# Patient Record
Sex: FEMALE | Race: WHITE | NOT HISPANIC OR LATINO | Employment: FULL TIME | URBAN - METROPOLITAN AREA
[De-identification: names, ages, dates, MRNs, and addresses within clinical notes are randomized per-mention and may not be internally consistent; named-entity substitution may affect disease eponyms.]

---

## 2018-04-13 ENCOUNTER — OFFICE VISIT (OUTPATIENT)
Dept: OBSTETRICS AND GYNECOLOGY | Age: 53
End: 2018-04-13

## 2018-04-13 VITALS
SYSTOLIC BLOOD PRESSURE: 124 MMHG | WEIGHT: 137 LBS | DIASTOLIC BLOOD PRESSURE: 82 MMHG | BODY MASS INDEX: 25.21 KG/M2 | HEIGHT: 62 IN

## 2018-04-13 DIAGNOSIS — Z12.31 SCREENING MAMMOGRAM, ENCOUNTER FOR: ICD-10-CM

## 2018-04-13 DIAGNOSIS — N95.1 MENOPAUSAL SYNDROME (HOT FLASHES): ICD-10-CM

## 2018-04-13 DIAGNOSIS — Z01.419 WELL WOMAN EXAM WITH ROUTINE GYNECOLOGICAL EXAM: Primary | ICD-10-CM

## 2018-04-13 PROCEDURE — 99396 PREV VISIT EST AGE 40-64: CPT | Performed by: NURSE PRACTITIONER

## 2018-04-13 PROCEDURE — 99213 OFFICE O/P EST LOW 20 MIN: CPT | Performed by: NURSE PRACTITIONER

## 2018-04-13 NOTE — PROGRESS NOTES
[facility]  Routine Annual Visit    2018    Patient: Tierney Arellano          MR#:[id number]      History of Present Illness    53 y.o. female  who presents for annual exam. Last annual with Dr. Hagan 2015. Pap negative, HPV negative. Last mammogram negative 2014. Colonoscopy 3 years ago. Tierney is postmenopausal- periods completely stopped several years ago. Tierney complains today of vasomotor symptoms, irritability, and lack of focus. She was prescribed HRT by Dr. Hagan several years ago but Tierney reports never took due to insurance issues. She requests a trial of HRT and also requests patches if covered by her insurance. Counseled on need for estrogen with a progesterone due to intact uterus. Tierney has no other complaints today. Declines STI testing. Sees PCP regularly per pt.      No LMP recorded (lmp unknown). Patient is postmenopausal.  Obstetric History:  OB History      Para Term  AB Living    2 2 2   2    SAB TAB Ectopic Molar Multiple Live Births         2         Menstrual History:     No LMP recorded (lmp unknown). Patient is postmenopausal.       Sexual History:       ________________________________________  There is no problem list on file for this patient.      Past Medical History:   Diagnosis Date   • Anemia    • Diverticulitis    • Headache     SINUS HEADACHE / MIGRAINE   • IBS (irritable bowel syndrome)        Past Surgical History:   Procedure Laterality Date   • BREAST AUGMENTATION      DOMINIQUE LATERAL   • KNEE SURGERY Right    • TUBAL ABDOMINAL LIGATION         History   Smoking Status   • Former Smoker   • Packs/day: 0.50   • Types: Cigarettes   Smokeless Tobacco   • Never Used       Family History   Problem Relation Age of Onset   • Osteoporosis Mother    • Thyroid disease Mother    • Diabetes Mother    • Heart disease Mother    • Hypertension Mother    • Stroke Mother    • Thyroid disease Father    • Diabetes Father    • Heart disease  Father    • Hypertension Father    • Osteoporosis Sister    • Lupus Sister    • Thyroid disease Sister    • Heart disease Sister    • Hypertension Sister    • Colon cancer Maternal Grandmother      late 60's   • Hodgkin's lymphoma Sister      NON   • Skin cancer Brother        Prior to Admission medications    Medication Sig Start Date End Date Taking? Authorizing Provider   Ascorbic Acid (VITAMIN C ER PO) Take  by mouth.   Yes Historical Provider, MD   CALCIUM CITRATE PO Take  by mouth.   Yes Historical Provider, MD   Cholecalciferol (VITAMIN D PO) Take  by mouth.   Yes Historical Provider, MD Cassidy Zingiber officinalis, (FELIPE PO) Take  by mouth.   Yes Historical Provider, MD   POTASSIUM CITRATE PO Take  by mouth.   Yes Historical Provider, MD   TURMERIC PO Take  by mouth.   Yes Historical Provider, MD     ________________________________________    Current contraception: post menopausal status and tubal ligation  Family history of uterine or ovarian cancer: no  Family History of colon cancer/colon polyps: no  History of abnormal mammogram: no  History of abnormal lipids: no    The following portions of the patient's history were reviewed and updated as appropriate: allergies, current medications, past family history, past medical history, past social history, past surgical history and problem list.    Review of Systems   Constitutional: Negative.    HENT: Negative.    Eyes: Negative for visual disturbance.   Respiratory: Negative for cough, shortness of breath and wheezing.    Cardiovascular: Negative for chest pain, palpitations and leg swelling.   Gastrointestinal: Negative for abdominal distention, abdominal pain, blood in stool, constipation, diarrhea, nausea and vomiting.   Endocrine: Positive for heat intolerance. Negative for cold intolerance.   Genitourinary: Negative for difficulty urinating, dyspareunia, dysuria, frequency, genital sores, hematuria, menstrual problem, pelvic pain, urgency, vaginal  "bleeding, vaginal discharge and vaginal pain.   Musculoskeletal: Negative.    Skin: Negative.    Neurological: Negative for dizziness, weakness, light-headedness, numbness and headaches.   Hematological: Negative.    Psychiatric/Behavioral: Positive for agitation and sleep disturbance.   Breasts: negative for lumps skin changes, dimpling, swelling, nipple changes/discharge bilaterally. Bilateral implants intact.      Objective   Physical Exam    /82   Ht 157.5 cm (62\")   Wt 62.1 kg (137 lb)   LMP  (LMP Unknown)   Breastfeeding? No   BMI 25.06 kg/m²    BP Readings from Last 3 Encounters:   04/13/18 124/82      Wt Readings from Last 3 Encounters:   04/13/18 62.1 kg (137 lb)        BMI: Estimated body mass index is 25.06 kg/m² as calculated from the following:    Height as of this encounter: 157.5 cm (62\").    Weight as of this encounter: 62.1 kg (137 lb).      General:   alert, appears stated age and cooperative   Heart: regular rate and rhythm, S1, S2 normal, no murmur, click, rub or gallop   Lungs: clear to auscultation bilaterally   Abdomen: soft, non-tender, without masses or organomegaly   Breast: inspection negative, no nipple discharge or bleeding, no masses or nodularity palpable   Vulva: normal   Vagina: normal mucosa, normal discharge   Cervix: no cervical motion tenderness and no lesions   Uterus: normal size, mobile, non-tender or normal shape and consistency   Adnexa: no mass, fullness, tenderness     Assessment:    1. Normal annual exam   2. HRT- counseled on risks, benefits, alternatives. Patient has been made aware of the black box warming from FDA about heart attacks, strokes, breast cancer, uterine cancer, and VTE risks. WHI discussed. Suggest lowest dose for shortest duration. Pt understands the risks and desires to proceed with therapy. 3 month supply given until pt has mammogram- will then refill for the year. Also recommend follow up on HRT in 3 months.  3. Healthy lifestyle " modifications discussed, counseled on self breast exams and bone health        Plan:    []  Rx:   [x]  Mammogram request made  [x]  PAP done  []  Occult fecal blood test (Insure)  []  Labs:   []  GC/Chl/TV  []  DEXA scan   []  Referral for colonoscopy:           LARY Birch  4/13/2018 9:57 AM

## 2018-04-17 LAB
CYTOLOGIST CVX/VAG CYTO: NORMAL
CYTOLOGY CVX/VAG DOC THIN PREP: NORMAL
DX ICD CODE: NORMAL
HIV 1 & 2 AB SER-IMP: NORMAL
HPV I/H RISK 4 DNA CVX QL PROBE+SIG AMP: NEGATIVE
OTHER STN SPEC: NORMAL
PATH REPORT.FINAL DX SPEC: NORMAL
STAT OF ADQ CVX/VAG CYTO-IMP: NORMAL

## 2018-04-18 ENCOUNTER — TELEPHONE (OUTPATIENT)
Dept: OBSTETRICS AND GYNECOLOGY | Age: 53
End: 2018-04-18

## 2018-04-18 NOTE — TELEPHONE ENCOUNTER
----- Message from LARY Orellana sent at 4/18/2018  9:55 AM EDT -----  Please inform patient her pap smear returned negative (normal). Thank you.

## 2018-04-19 ENCOUNTER — TELEPHONE (OUTPATIENT)
Dept: OBSTETRICS AND GYNECOLOGY | Age: 53
End: 2018-04-19

## 2018-04-24 ENCOUNTER — TELEPHONE (OUTPATIENT)
Dept: OBSTETRICS AND GYNECOLOGY | Age: 53
End: 2018-04-24

## 2018-07-19 ENCOUNTER — OFFICE VISIT (OUTPATIENT)
Dept: OBSTETRICS AND GYNECOLOGY | Age: 53
End: 2018-07-19

## 2018-07-19 VITALS
DIASTOLIC BLOOD PRESSURE: 70 MMHG | WEIGHT: 133 LBS | SYSTOLIC BLOOD PRESSURE: 100 MMHG | BODY MASS INDEX: 24.48 KG/M2 | HEIGHT: 62 IN

## 2018-07-19 DIAGNOSIS — N95.1 VASOMOTOR SYMPTOMS DUE TO MENOPAUSE: Primary | ICD-10-CM

## 2018-07-19 PROCEDURE — 99213 OFFICE O/P EST LOW 20 MIN: CPT | Performed by: NURSE PRACTITIONER

## 2018-07-19 NOTE — PROGRESS NOTES
[facility]  Routine Annual Visit    2018    Patient: Tierney Arellano          MR#:[id number]      History of Present Illness    53 y.o. female  who presents for follow up HRT initiation. Tierney was started on combipatch April of this year for severe hot flashes, irritability, and lack of focus. She reports her symptoms have improved and she has been happy with the response of the hormones. She has still not gone for her screening mammogram as highly encouraged last visit. She reports working 12 hour days and overtime during the summer. Advised we need benign mammogram on file to continue prescribing HRT. She promises she will get this done within the next 3 months.     No LMP recorded (lmp unknown). Patient is postmenopausal.  Obstetric History:  OB History      Para Term  AB Living    2 2 2     2    SAB TAB Ectopic Molar Multiple Live Births              2         Menstrual History:     No LMP recorded (lmp unknown). Patient is postmenopausal.       Sexual History:       ________________________________________  There is no problem list on file for this patient.      Past Medical History:   Diagnosis Date   • Anemia    • Diverticulitis    • Headache     SINUS HEADACHE / MIGRAINE   • IBS (irritable bowel syndrome)        Past Surgical History:   Procedure Laterality Date   • BREAST AUGMENTATION      DOMINIQUE LATERAL   • KNEE SURGERY Right    • TUBAL ABDOMINAL LIGATION         History   Smoking Status   • Former Smoker   • Packs/day: 0.50   • Types: Cigarettes   Smokeless Tobacco   • Never Used       Family History   Problem Relation Age of Onset   • Osteoporosis Mother    • Thyroid disease Mother    • Diabetes Mother    • Heart disease Mother    • Hypertension Mother    • Stroke Mother    • Thyroid disease Father    • Diabetes Father    • Heart disease Father    • Hypertension Father    • Osteoporosis Sister    • Lupus Sister    • Thyroid disease Sister    • Heart disease  "Sister    • Hypertension Sister    • Colon cancer Maternal Grandmother         late 60's   • Hodgkin's lymphoma Sister         NON   • Skin cancer Brother        Prior to Admission medications    Medication Sig Start Date End Date Taking? Authorizing Provider   Ascorbic Acid (VITAMIN C ER PO) Take  by mouth.    Historical Provider, MD   CALCIUM CITRATE PO Take  by mouth.    Historical Provider, MD   Cholecalciferol (VITAMIN D PO) Take  by mouth.    Historical Provider, MD   estradiol-norethindrone (COMBIPATCH) 0.05-0.14 MG/DAY patch Place 1 patch on the skin 2 (Two) Times a Week. 4/16/18   LARY Orellana   Ange, Zingiber officinalis, (ANGE PO) Take  by mouth.    Historical Provider, MD   POTASSIUM CITRATE PO Take  by mouth.    Historical Provider, MD   TURMERIC PO Take  by mouth.    Historical Provider, MD     ________________________________________    The following portions of the patient's history were reviewed and updated as appropriate: allergies, current medications, past family history, past medical history, past social history, past surgical history and problem list.    Review of Systems    Pertinent items are noted in HPI.     Objective   Physical Exam    LMP  (LMP Unknown)    BP Readings from Last 3 Encounters:   07/19/18 100/70   04/13/18 124/82      Wt Readings from Last 3 Encounters:   07/19/18 60.3 kg (133 lb)   04/13/18 62.1 kg (137 lb)        BMI: Estimated body mass index is 24.33 kg/m² as calculated from the following:    Height as of an earlier encounter on 7/19/18: 157.5 cm (62\").    Weight as of an earlier encounter on 7/19/18: 60.3 kg (133 lb).    Consult only    Assessment:    1. HRT- doing well on combipatch and desires to continue. Will only give 3 more months until pt gets mammogram. Reinforced risks- heart attacks, strokes, breast cancer, uterine cancer, and VTE risks. She verbalizes understanding and desires to continue on therapy. Plans to schedule mammogram within the next " several months. Order is in system. If she has any trouble at all she will call us.      Plan:    []  Rx:   []  Mammogram request made  []  PAP done  []  Occult fecal blood test (Insure)  []  Labs:   []  GC/Chl/TV  []  DEXA scan   []  Referral for colonoscopy:           Terrie Graham, APRN  7/19/2018 3:33 PM

## 2019-07-18 ENCOUNTER — OFFICE VISIT (OUTPATIENT)
Dept: OBSTETRICS AND GYNECOLOGY | Age: 54
End: 2019-07-18

## 2019-07-18 VITALS
BODY MASS INDEX: 24.66 KG/M2 | SYSTOLIC BLOOD PRESSURE: 110 MMHG | HEIGHT: 62 IN | DIASTOLIC BLOOD PRESSURE: 68 MMHG | WEIGHT: 134 LBS

## 2019-07-18 DIAGNOSIS — Z12.31 SCREENING MAMMOGRAM, ENCOUNTER FOR: ICD-10-CM

## 2019-07-18 DIAGNOSIS — Z01.419 WELL WOMAN EXAM WITH ROUTINE GYNECOLOGICAL EXAM: Primary | ICD-10-CM

## 2019-07-18 PROCEDURE — 99396 PREV VISIT EST AGE 40-64: CPT | Performed by: NURSE PRACTITIONER

## 2019-07-18 NOTE — PROGRESS NOTES
[facility]  Routine Annual Visit    2019    Patient: Tierney Arellano          MR#:[id number]      History of Present Illness    54 y.o. female  who presents for annual exam. Last pap negative, HPV negative 2018. Tierney has still not complied with getting her screening mammogram as advised multiple times. I will not refill her HRT until this is completed. Tierney is postmenopausal. No bleeding. She continues on combipatch with very good control of her vasomotor symptoms. She reports up to ate on cholesterol screening and physical with Dr. Bassett. Also reports up to date on colonoscopy. She has no complaints today.      No LMP recorded (lmp unknown). Patient is postmenopausal.  Obstetric History:  OB History      Para Term  AB Living    2 2 2     2    SAB TAB Ectopic Molar Multiple Live Births              2         Menstrual History:     No LMP recorded (lmp unknown). Patient is postmenopausal.       Sexual History:       ________________________________________  There is no problem list on file for this patient.      Past Medical History:   Diagnosis Date   • Anemia    • Diverticulitis    • Headache     SINUS HEADACHE / MIGRAINE   • IBS (irritable bowel syndrome)        Past Surgical History:   Procedure Laterality Date   • BREAST AUGMENTATION      DOMINIQUE LATERAL   • KNEE ARTHROSCOPY Right 2019    turned into much more complicated   • KNEE SURGERY Right    • TUBAL ABDOMINAL LIGATION         Social History     Tobacco Use   Smoking Status Former Smoker   • Packs/day: 0.50   • Types: Cigarettes   Smokeless Tobacco Never Used       Family History   Problem Relation Age of Onset   • Osteoporosis Mother    • Thyroid disease Mother    • Diabetes Mother    • Heart disease Mother    • Hypertension Mother    • Stroke Mother    • Thyroid disease Father    • Diabetes Father    • Heart disease Father    • Hypertension Father    • Osteoporosis Sister    • Lupus Sister    •  Thyroid disease Sister    • Heart disease Sister    • Hypertension Sister    • Colon cancer Maternal Grandmother         late 60's   • Hodgkin's lymphoma Sister         NON   • Skin cancer Brother        Prior to Admission medications    Medication Sig Start Date End Date Taking? Authorizing Provider   estradiol-norethindrone (COMBIPATCH) 0.05-0.14 MG/DAY patch Place 1 patch on the skin as directed by provider 2 (Two) Times a Week. 4/4/19  Yes Jose Yañez MD   estradiol-norethindrone (COMBIPATCH) 0.05-0.14 MG/DAY patch Place 1 patch on the skin as directed by provider 2 (Two) Times a Week. 8/16/18  Yes Nolberto Hamm MD   Ascorbic Acid (VITAMIN C ER PO) Take  by mouth.    Nolberto Hamm MD   CALCIUM CITRATE PO Take  by mouth.    Nolberto Hamm MD   Cholecalciferol (VITAMIN D PO) Take  by mouth.    Nolberto Hamm MD   Ange, Zingiber officinalis, (ANGE PO) Take  by mouth.    Nolberto Hamm MD   POTASSIUM CITRATE PO Take  by mouth.    Nolberto Hamm MD   TURMERIC PO Take  by mouth.    Nolberto Hamm MD     ________________________________________    The following portions of the patient's history were reviewed and updated as appropriate: allergies, current medications, past family history, past medical history, past social history, past surgical history and problem list.    Review of Systems   Constitutional: Negative.    HENT: Negative.    Eyes: Negative for visual disturbance.   Respiratory: Negative for cough, shortness of breath and wheezing.    Cardiovascular: Negative for chest pain, palpitations and leg swelling.   Gastrointestinal: Negative for abdominal distention, abdominal pain, blood in stool, constipation, diarrhea, nausea and vomiting.   Endocrine: Negative for cold intolerance and heat intolerance.   Genitourinary: Negative for difficulty urinating, dyspareunia, dysuria, frequency, genital sores, hematuria, menstrual problem, pelvic pain, urgency,  "vaginal bleeding, vaginal discharge and vaginal pain.   Musculoskeletal: Negative.    Skin: Negative.    Neurological: Negative for dizziness, weakness, light-headedness, numbness and headaches.   Hematological: Negative.    Psychiatric/Behavioral: Negative.    Breasts: negative for lumps skin changes, dimpling, swelling, nipple changes/discharge bilaterally         Objective   Physical Exam    /68   Ht 157.5 cm (62\")   Wt 60.8 kg (134 lb)   LMP  (LMP Unknown)   Breastfeeding? No   BMI 24.51 kg/m²    BP Readings from Last 3 Encounters:   07/18/19 110/68   07/19/18 100/70   07/19/18 100/70      Wt Readings from Last 3 Encounters:   07/18/19 60.8 kg (134 lb)   07/19/18 60.3 kg (133 lb)   07/19/18 60.3 kg (133 lb)        BMI: Estimated body mass index is 24.51 kg/m² as calculated from the following:    Height as of this encounter: 157.5 cm (62\").    Weight as of this encounter: 60.8 kg (134 lb).            General:   alert, appears stated age and cooperative   Heart: regular rate and rhythm, S1, S2 normal, no murmur, click, rub or gallop   Lungs: clear to auscultation bilaterally   Abdomen: soft, non-tender, without masses or organomegaly   Breast: inspection negative, no nipple discharge or bleeding, no masses or nodularity palpable   Vulva: normal   Vagina: normal mucosa, normal discharge   Cervix: no cervical motion tenderness and no lesions   Uterus: normal size, mobile, non-tender or normal shape and consistency   Adnexa: no mass, fullness, tenderness     Assessment:    1. Normal annual exam   2. HRT- Patient has been made aware of the black box warming from FDA about heart attacks, strokes, breast cancer, uterine cancer, and VTE risks.  3. Healthy lifestyle modifications discussed, counseled on self breast exams and bone health        Plan:    []  Rx:   [x]  Mammogram request made  [x]  PAP done  []  Occult fecal blood test (Insure)  []  Labs:   []  GC/Chl/TV  []  DEXA scan   []  Referral for " colonoscopy:           Terrie Graham, APRN  7/18/2019 11:11 AM

## 2019-07-23 LAB
CYTOLOGIST CVX/VAG CYTO: ABNORMAL
CYTOLOGY CVX/VAG DOC CYTO: ABNORMAL
CYTOLOGY CVX/VAG DOC THIN PREP: ABNORMAL
DX ICD CODE: ABNORMAL
DX ICD CODE: ABNORMAL
HIV 1 & 2 AB SER-IMP: ABNORMAL
HPV I/H RISK 4 DNA CVX QL PROBE+SIG AMP: POSITIVE
OTHER STN SPEC: ABNORMAL
PATHOLOGIST CVX/VAG CYTO: ABNORMAL
RECOM F/U CVX/VAG CYTO: ABNORMAL
STAT OF ADQ CVX/VAG CYTO-IMP: ABNORMAL

## 2019-07-29 ENCOUNTER — TELEPHONE (OUTPATIENT)
Dept: OBSTETRICS AND GYNECOLOGY | Age: 54
End: 2019-07-29

## 2019-07-29 PROBLEM — R87.810 ASCUS WITH POSITIVE HIGH RISK HPV CERVICAL: Status: ACTIVE | Noted: 2019-07-29

## 2019-07-29 PROBLEM — R87.610 ASCUS WITH POSITIVE HIGH RISK HPV CERVICAL: Status: ACTIVE | Noted: 2019-07-29

## 2019-07-29 NOTE — TELEPHONE ENCOUNTER
----- Message from LARY Orellana sent at 7/29/2019  1:45 PM EDT -----  Please inform patient:  Your recent pap smear showed mild inflammatory changes only.   These changes occur as a result of mild inflammation in the vagina  When the pap smear shows inflammation, we automatically check for the HPV virus.   This test for HPV returned positive    With this result further evaluation with colposcopy is indicated. Please help patient arrange colpo in about 3 months with Dr. Yañez. Thank you.

## 2019-08-02 ENCOUNTER — APPOINTMENT (OUTPATIENT)
Dept: MAMMOGRAPHY | Facility: HOSPITAL | Age: 54
End: 2019-08-02

## 2019-09-19 NOTE — TELEPHONE ENCOUNTER
Last annual 7/18/19, pap was ASCUS/hpv positive.  Has f/u appointment with Dr Hagan on 10/8/19.

## 2019-09-23 NOTE — TELEPHONE ENCOUNTER
Mariella please let patient know I have denied her hormone patch as she has still not gotten her mammogram. When mammogram is completed I will be happy to refill her hormones.

## 2019-11-04 ENCOUNTER — TELEPHONE (OUTPATIENT)
Dept: OBSTETRICS AND GYNECOLOGY | Age: 54
End: 2019-11-04

## 2019-11-11 ENCOUNTER — TELEPHONE (OUTPATIENT)
Dept: OBSTETRICS AND GYNECOLOGY | Age: 54
End: 2019-11-11

## 2019-11-12 DIAGNOSIS — R92.8 ABNORMALITY OF RIGHT BREAST ON SCREENING MAMMOGRAM: Primary | ICD-10-CM

## 2019-12-03 ENCOUNTER — PROCEDURE VISIT (OUTPATIENT)
Dept: OBSTETRICS AND GYNECOLOGY | Age: 54
End: 2019-12-03

## 2019-12-03 VITALS
DIASTOLIC BLOOD PRESSURE: 80 MMHG | HEIGHT: 62 IN | BODY MASS INDEX: 25.95 KG/M2 | WEIGHT: 141 LBS | SYSTOLIC BLOOD PRESSURE: 125 MMHG

## 2019-12-03 DIAGNOSIS — Z20.2 EXPOSURE TO STD: ICD-10-CM

## 2019-12-03 DIAGNOSIS — Z13.9 SPECIAL SCREENING: ICD-10-CM

## 2019-12-03 DIAGNOSIS — R92.8 ABNORMALITY OF RIGHT BREAST ON SCREENING MAMMOGRAM: ICD-10-CM

## 2019-12-03 DIAGNOSIS — R87.810 ASCUS WITH POSITIVE HIGH RISK HPV CERVICAL: Primary | ICD-10-CM

## 2019-12-03 DIAGNOSIS — R87.610 ASCUS WITH POSITIVE HIGH RISK HPV CERVICAL: Primary | ICD-10-CM

## 2019-12-03 LAB
B-HCG UR QL: NEGATIVE
INTERNAL NEGATIVE CONTROL: NEGATIVE
INTERNAL POSITIVE CONTROL: POSITIVE
Lab: NORMAL

## 2019-12-03 PROCEDURE — 81025 URINE PREGNANCY TEST: CPT | Performed by: OBSTETRICS & GYNECOLOGY

## 2019-12-03 PROCEDURE — 57455 BIOPSY OF CERVIX W/SCOPE: CPT | Performed by: OBSTETRICS & GYNECOLOGY

## 2019-12-03 NOTE — PROGRESS NOTES
"Subjective   Tierney Arellano is a 54 y.o. female . colpo today last pap  07/18/19 ascus . hpv pos , other pap 04/13/18 neg.   Patient has a new partner as of Feb this year and they recently got . Never had an abnormal pap until now. Patient does use tobacco. Had MGM on 11/11/19 and has mgm scheduled 12/9 for follow-up on right breast. Desires to resume Combi-patch after mgm if wnl.       History of Present Illness    The following portions of the patient's history were reviewed and updated as appropriate: allergies, current medications, past family history, past medical history, past social history, past surgical history and problem list.    Review of Systems   Constitutional: Negative for chills, fatigue and fever.   Gastrointestinal: Negative for abdominal distention and abdominal pain.   Genitourinary: Negative for dyspareunia, dysuria, menstrual problem, pelvic pain, vaginal bleeding, vaginal discharge and vaginal pain.   All other systems reviewed and are negative.    /80   Ht 157.5 cm (62\")   Wt 64 kg (141 lb)   LMP  (LMP Unknown)   Breastfeeding? No   BMI 25.79 kg/m²     Objective   Physical Exam   Constitutional: She is oriented to person, place, and time. She appears well-developed and well-nourished.   Pulmonary/Chest: Effort normal.   Neurological: She is alert and oriented to person, place, and time.   Skin: Skin is warm and dry.   Psychiatric: She has a normal mood and affect. Her behavior is normal.   Nursing note and vitals reviewed.      Assessment/Plan   Tierney was seen today for gynecologic exam.    Diagnoses and all orders for this visit:    ASCUS with positive high risk HPV cervical  -     Reference Histopathology    Special screening  -     POC Pregnancy, Urine    Abnormality of right breast on screening mammogram    Exposure to STD  -     NuSwab VG+ - Swab, Vagina       Counseling was given to patient for the following topics: diagnostic results, instructions for management, risk " factor reductions and importance of treatment compliance .

## 2019-12-03 NOTE — PROGRESS NOTES
Procedure   Procedures    Colposcopy Procedure Note    Indications: Pap smear 5 months ago showed: ASCUS with POSITIVE high risk HPV. The prior pap showed no abnormalities.  Prior cervical/vaginal disease: none. Prior cervical treatment: no treatment.    Procedure Details   The risks and benefits of the procedure and Written informed consent obtained.    Speculum placed in vagina and excellent visualization of cervix achieved, cervix swabbed x 3 with acetic acid solution.    Findings:  Cervix: acetowhite lesion(s) noted at 6 and 10  o'clock; SCJ visualized - lesion at 6 and 10  o'clock, cervical biopsies taken at 6 and 10  o'clock, specimen labelled and sent to pathology and hemostasis achieved with Monsel's solution.  Vaginal inspection: vaginal colposcopy not performed.  Vulvar colposcopy: vulvar colposcopy not performed.    Specimens: cervical biopsy x 2    Complications: none.  Patient tolerated the procedure well without complications.    Plan:  Specimens labelled and sent to Pathology.  Will base further treatment on Pathology findings.  Post biopsy instructions given to patient.

## 2019-12-06 LAB
A VAGINAE DNA VAG QL NAA+PROBE: NORMAL SCORE
BVAB2 DNA VAG QL NAA+PROBE: NORMAL SCORE
C ALBICANS DNA VAG QL NAA+PROBE: NEGATIVE
C GLABRATA DNA VAG QL NAA+PROBE: NEGATIVE
C TRACH RRNA SPEC QL NAA+PROBE: NEGATIVE
MEGA1 DNA VAG QL NAA+PROBE: NORMAL SCORE
N GONORRHOEA RRNA SPEC QL NAA+PROBE: NEGATIVE
T VAGINALIS RRNA SPEC QL NAA+PROBE: NEGATIVE

## 2019-12-09 LAB
DX ICD CODE: NORMAL
PATH REPORT.FINAL DX SPEC: NORMAL
PATH REPORT.GROSS SPEC: NORMAL
PATH REPORT.RELEVANT HX SPEC: NORMAL
PATH REPORT.SITE OF ORIGIN SPEC: NORMAL
PATHOLOGIST NAME: NORMAL
PAYMENT PROCEDURE: NORMAL

## 2019-12-10 ENCOUNTER — TELEPHONE (OUTPATIENT)
Dept: OBSTETRICS AND GYNECOLOGY | Age: 54
End: 2019-12-10

## 2019-12-10 DIAGNOSIS — N95.1 MENOPAUSAL SYMPTOMS: Primary | ICD-10-CM

## 2019-12-10 PROBLEM — N87.0 MILD DYSPLASIA OF CERVIX (CIN I): Status: ACTIVE | Noted: 2019-12-10

## 2019-12-10 NOTE — TELEPHONE ENCOUNTER
----- Message from Marlene Hagan MD sent at 12/9/2019  9:56 AM EST -----  Please call patient and notify of normal results of vaginal swab    Please call patient and notify of mild dysplasia on biopsies - needs repeat pap in one year

## 2019-12-10 NOTE — TELEPHONE ENCOUNTER
Patient phoned for her results.  Given and verbalized understanding.  Also, requests her hormone patch be sent to [facility].

## 2020-09-10 ENCOUNTER — TELEPHONE (OUTPATIENT)
Dept: OBSTETRICS AND GYNECOLOGY | Age: 55
End: 2020-09-10

## 2020-09-10 NOTE — TELEPHONE ENCOUNTER
CALLED PT HAD HER SCHEDULED 6/8/20 AT [facility] FOR 6 MONTH DIAG MAMMO AND US. SHE HAD TO CX THAT AS SHE WAS HAVING A KNEE REPLACEMENT AND WANTED TO WAIT UNTIL AUGUST. BEEN REACHING OUT TO PATIENT TO GET RESCHEDULED. SHE AGREES TO GO TO [facility] AS [facility] HAS NOT BEEN WORKING OUT FOR HER WITH SCHEDULING. I HAVE PT RESCHEDULED FOR R DIAG MAMM AND US AT [facility] 9/29/20. WILL FOLLOW- UP TO SEE PT GOES.  MP

## 2020-09-29 ENCOUNTER — HOSPITAL ENCOUNTER (OUTPATIENT)
Dept: ULTRASOUND IMAGING | Facility: HOSPITAL | Age: 55
Discharge: HOME | End: 2020-09-29

## 2020-09-29 ENCOUNTER — HOSPITAL ENCOUNTER (OUTPATIENT)
Dept: MAMMOGRAPHY | Facility: HOSPITAL | Age: 55
Discharge: HOME | End: 2020-09-29

## 2020-09-29 DIAGNOSIS — R92.8 ABNORMALITY OF RIGHT BREAST ON SCREENING MAMMOGRAM: ICD-10-CM

## 2020-09-29 PROCEDURE — 77065 DX MAMMO INCL CAD UNI: CPT

## 2020-09-29 PROCEDURE — 76642 ULTRASOUND BREAST LIMITED: CPT

## 2020-11-10 DIAGNOSIS — N95.1 MENOPAUSAL SYMPTOMS: ICD-10-CM

## 2020-11-10 RX ORDER — ESTRADIOL/NORETHINDRONE ACETATE TRANSDERMAL SYSTEM .05; .14 MG/D; MG/D
PATCH, EXTENDED RELEASE TRANSDERMAL
Qty: 24 EACH | Refills: 3 | Status: SHIPPED | OUTPATIENT
Start: 2020-11-10 | End: 2021-04-29

## 2020-11-10 NOTE — TELEPHONE ENCOUNTER
Please see Pap for 2019, looks like Terrie said 3 month Colpo, but did not return.Attempt to call pt with no working #.dn

## 2021-04-29 ENCOUNTER — OFFICE VISIT (OUTPATIENT)
Dept: OBSTETRICS AND GYNECOLOGY | Age: 56
End: 2021-04-29

## 2021-04-29 VITALS
SYSTOLIC BLOOD PRESSURE: 130 MMHG | DIASTOLIC BLOOD PRESSURE: 80 MMHG | HEIGHT: 62 IN | WEIGHT: 148 LBS | BODY MASS INDEX: 27.23 KG/M2

## 2021-04-29 DIAGNOSIS — Z01.419 WELL WOMAN EXAM WITH ROUTINE GYNECOLOGICAL EXAM: Primary | ICD-10-CM

## 2021-04-29 DIAGNOSIS — Z12.31 SCREENING MAMMOGRAM, ENCOUNTER FOR: ICD-10-CM

## 2021-04-29 PROBLEM — R92.8 ABNORMALITY OF RIGHT BREAST ON SCREENING MAMMOGRAM: Status: RESOLVED | Noted: 2019-11-12 | Resolved: 2021-04-29

## 2021-04-29 PROCEDURE — 99396 PREV VISIT EST AGE 40-64: CPT | Performed by: NURSE PRACTITIONER

## 2021-04-29 RX ORDER — VARENICLINE TARTRATE 1 MG/1
TABLET, FILM COATED ORAL
COMMUNITY
Start: 2021-03-12 | End: 2021-12-06

## 2021-04-29 RX ORDER — ESTRADIOL AND NORETHINDRONE ACETATE 1; .5 MG/1; MG/1
1 TABLET ORAL DAILY
Qty: 90 TABLET | Refills: 3 | Status: SHIPPED | OUTPATIENT
Start: 2021-04-29 | End: 2022-04-07

## 2021-04-29 NOTE — PROGRESS NOTES
[facility]  Routine Annual Visit    2021    Patient: Betty Hume          MR#:[id number]      History of Present Illness    56 y.o. female  who presents for annual exam.    Last pap ASCUS HPV positive followed by colpo with Dr. Hagan 2019- repeat cotesting today  Due for screening mammogram- order placed  Colonoscopy current per patient  Tierney is postmenopausal- no bleeding. Continues on combipatch with good control of vasomotor sxs. She does request to be switched to oral regimen as the patches are falling off easily. Counseled on the improved safety with transdermal route vs oral. Discussed risks of HRT in general.    Tierney has no complaints today      No LMP recorded (lmp unknown). Patient is postmenopausal.  Obstetric History:  OB History        2    Para   2    Term   2       0    AB   0    Living   2       SAB   0    TAB   0    Ectopic   0    Molar   0    Multiple   0    Live Births   2               Menstrual History:     No LMP recorded (lmp unknown). Patient is postmenopausal.       Sexual History:       ________________________________________  Patient Active Problem List   Diagnosis   • ASCUS with positive high risk HPV cervical   • Mild dysplasia of cervix (SAVITA I)       Past Medical History:   Diagnosis Date   • Abnormality of right breast on screening mammogram 2019   • Anemia    • ASCUS with positive high risk HPV cervical 2019   • Cervical dysplasia    • Diverticulitis    • Headache     SINUS HEADACHE / MIGRAINE   • HPV (human papilloma virus) infection    • IBS (irritable bowel syndrome)        Past Surgical History:   Procedure Laterality Date   • AUGMENTATION MAMMAPLASTY     • BREAST AUGMENTATION      DOMINIQUE LATERAL   • KNEE ARTHROSCOPY Right 2019    turned into much more complicated   • KNEE SURGERY Right    • TOTAL KNEE ARTHROPLASTY Right 2020   • TUBAL ABDOMINAL LIGATION         Social History     Tobacco Use   Smoking Status  Current Every Day Smoker   • Packs/day: 0.50   • Types: Cigarettes   Smokeless Tobacco Never Used       Family History   Problem Relation Age of Onset   • Osteoporosis Mother    • Thyroid disease Mother    • Diabetes Mother    • Heart disease Mother    • Hypertension Mother    • Stroke Mother    • Thyroid disease Father    • Diabetes Father    • Heart disease Father    • Hypertension Father    • Osteoporosis Sister    • Lupus Sister    • Thyroid disease Sister    • Heart disease Sister    • Hypertension Sister    • Colon cancer Maternal Grandmother         late 60's   • Hodgkin's lymphoma Sister         NON   • Skin cancer Brother    • Breast cancer Neg Hx    • Ovarian cancer Neg Hx    • Uterine cancer Neg Hx        Prior to Admission medications    Medication Sig Start Date End Date Taking? Authorizing Provider   CombiPatch 0.05-0.14 MG/DAY patch APPLY 1 PATCH ON THE SKIN 2 TIMES A WEEK AS DIRECTED BY PROVIDER 11/10/20   Marlene Hagan MD       The following portions of the patient's history were reviewed and updated as appropriate: allergies, current medications, past family history, past medical history, past social history, past surgical history and problem list.    Review of Systems   Constitutional: Negative.    HENT: Negative.    Eyes: Negative for visual disturbance.   Respiratory: Negative for cough, shortness of breath and wheezing.    Cardiovascular: Negative for chest pain, palpitations and leg swelling.   Gastrointestinal: Negative for abdominal distention, abdominal pain, blood in stool, constipation, diarrhea, nausea and vomiting.   Endocrine: Negative for cold intolerance and heat intolerance.   Genitourinary: Negative for difficulty urinating, dyspareunia, dysuria, frequency, genital sores, hematuria, menstrual problem, pelvic pain, urgency, vaginal bleeding, vaginal discharge and vaginal pain.   Musculoskeletal: Negative.    Skin: Negative.    Neurological: Negative for dizziness, weakness,  "light-headedness, numbness and headaches.   Hematological: Negative.    Psychiatric/Behavioral: Negative.    Breasts: negative for lumps skin changes, dimpling, swelling, nipple changes/discharge bilaterally       Objective   Physical Exam    /80   Ht 157.5 cm (62\")   Wt 67.1 kg (148 lb)   LMP  (LMP Unknown)   Breastfeeding No   BMI 27.07 kg/m²    BP Readings from Last 3 Encounters:   04/29/21 130/80   12/03/19 125/80   07/18/19 110/68      Wt Readings from Last 3 Encounters:   04/29/21 67.1 kg (148 lb)   12/03/19 64 kg (141 lb)   07/18/19 60.8 kg (134 lb)        BMI: Estimated body mass index is 27.07 kg/m² as calculated from the following:    Height as of this encounter: 157.5 cm (62\").    Weight as of this encounter: 67.1 kg (148 lb).            General:   alert, appears stated age and cooperative   Heart: regular rate and rhythm, S1, S2 normal, no murmur, click, rub or gallop   Lungs: clear to auscultation bilaterally   Abdomen: soft, non-tender, without masses or organomegaly   Breast: inspection negative, no nipple discharge or bleeding, no masses or nodularity palpable   Vulva: External genitalia including bartholin's glands, Urethra, [place]'s gland and urethra meatus are normal, Perineum, rectum and anus appear normal  and Bladder appears normal without significant prolapse    Vagina: normal mucosa, normal discharge   Cervix: no cervical motion tenderness and no lesions   Uterus: normal size, mobile, non-tender or normal shape and consistency   Adnexa: no mass, fullness, tenderness     Assessment:    Diagnoses and all orders for this visit:    1. Well woman exam with routine gynecological exam (Primary)  -     IgP, Aptima HPV    2. Screening mammogram, encounter for  -     Mammo Screening Digital Tomosynthesis Bilateral With CAD; Future    Other orders  -     estradiol-norethindrone (Activella) 1-0.5 MG per tablet; Take 1 tablet by mouth Daily.  Dispense: 90 tablet; Refill: 3        Healthy " lifestyle modifications discussed, counseled on self breast exams and bone health    All of the patient's questions were addressed and answered, I have encouraged her to call for today's test results if she has not received them within 10 days.  Patient is advised to call with any change in her condition or with any other questions, otherwise return in 12 months for annual examination.      Terrie Graham, APRN  4/29/2021 14:03 EDT

## 2021-05-04 LAB
CYTOLOGIST CVX/VAG CYTO: NORMAL
CYTOLOGY CVX/VAG DOC CYTO: NORMAL
CYTOLOGY CVX/VAG DOC THIN PREP: NORMAL
DX ICD CODE: NORMAL
HIV 1 & 2 AB SER-IMP: NORMAL
HPV I/H RISK 4 DNA CVX QL PROBE+SIG AMP: NEGATIVE
Lab: NORMAL
OTHER STN SPEC: NORMAL
STAT OF ADQ CVX/VAG CYTO-IMP: NORMAL

## 2021-05-06 ENCOUNTER — TELEPHONE (OUTPATIENT)
Dept: OBSTETRICS AND GYNECOLOGY | Age: 56
End: 2021-05-06

## 2021-10-29 ENCOUNTER — TELEPHONE (OUTPATIENT)
Dept: CARDIOLOGY | Facility: CLINIC | Age: 56
End: 2021-10-29

## 2021-10-29 ENCOUNTER — TRANSCRIBE ORDERS (OUTPATIENT)
Dept: CARDIOLOGY | Facility: CLINIC | Age: 56
End: 2021-10-29

## 2021-10-29 DIAGNOSIS — I73.9 PVD (PERIPHERAL VASCULAR DISEASE) WITH CLAUDICATION (HCC): Primary | ICD-10-CM

## 2021-12-06 ENCOUNTER — TELEPHONE (OUTPATIENT)
Dept: CARDIOLOGY | Facility: CLINIC | Age: 56
End: 2021-12-06

## 2021-12-06 ENCOUNTER — OFFICE VISIT (OUTPATIENT)
Dept: CARDIOLOGY | Facility: CLINIC | Age: 56
End: 2021-12-06

## 2021-12-06 VITALS
TEMPERATURE: 97 F | BODY MASS INDEX: 27.05 KG/M2 | DIASTOLIC BLOOD PRESSURE: 74 MMHG | RESPIRATION RATE: 11 BRPM | OXYGEN SATURATION: 99 % | HEIGHT: 62 IN | HEART RATE: 89 BPM | SYSTOLIC BLOOD PRESSURE: 118 MMHG | WEIGHT: 147 LBS

## 2021-12-06 DIAGNOSIS — R07.2 PRECORDIAL PAIN: ICD-10-CM

## 2021-12-06 DIAGNOSIS — Z72.0 TOBACCO USE: ICD-10-CM

## 2021-12-06 DIAGNOSIS — I73.9 PVD (PERIPHERAL VASCULAR DISEASE) WITH CLAUDICATION (HCC): Primary | ICD-10-CM

## 2021-12-06 PROBLEM — R07.9 CHEST PAIN: Status: ACTIVE | Noted: 2021-12-06

## 2021-12-06 PROCEDURE — 99204 OFFICE O/P NEW MOD 45 MIN: CPT | Performed by: INTERNAL MEDICINE

## 2021-12-06 PROCEDURE — 93000 ELECTROCARDIOGRAM COMPLETE: CPT | Performed by: INTERNAL MEDICINE

## 2021-12-06 RX ORDER — ASPIRIN 81 MG/1
81 TABLET ORAL DAILY
Qty: 30 TABLET | Refills: 0 | Status: SHIPPED | OUTPATIENT
Start: 2021-12-06

## 2021-12-06 RX ORDER — RAMIPRIL 2.5 MG/1
2.5 CAPSULE ORAL DAILY
Qty: 30 CAPSULE | Refills: 0 | Status: SHIPPED | OUTPATIENT
Start: 2021-12-06 | End: 2022-07-29 | Stop reason: SDUPTHER

## 2021-12-06 RX ORDER — RAMIPRIL 2.5 MG/1
2.5 CAPSULE ORAL DAILY
Qty: 90 CAPSULE | Refills: 3 | Status: SHIPPED | OUTPATIENT
Start: 2021-12-06 | End: 2021-12-06 | Stop reason: SDUPTHER

## 2021-12-06 RX ORDER — ASPIRIN 81 MG/1
81 TABLET ORAL DAILY
Qty: 90 TABLET | Refills: 3 | Status: SHIPPED | OUTPATIENT
Start: 2021-12-06 | End: 2021-12-06 | Stop reason: SDUPTHER

## 2021-12-06 NOTE — PROGRESS NOTES
[facility]  [facility]  [address] [place] [address]  Dept: [phone]  Dept Fax: [phone]    Betty Hume  1965    New Patient Office Note    History of Present Illness:  Betty Hume is a 56 y.o. female who presents to the clinic for New Patient. Chest pain and claudication- She is 56 years old with Hyperlipidemia, smoker has been having legs pain on walking, and JHONNY showed mild to moderate disease bilateral but she is also having chest pain, somewhat atypical., at resting and  also on exertion, sharp but also pressure, lasting 2-3 minutes, . And at least once a week, her Lipids were elevated and was started on Crestor 5 mg, has had problems with statins before , her BP is 135.80., her EKG sinus  With PAC`S. Her cardiac exam seems normal, will start ASA,  And also Altace 2,5 mg daily, will get a echo and also a stress nuclear, high risk due to PVD and also smoker    The following portions of the patient's history were reviewed and updated as appropriate: allergies, current medications, past family history, past medical history, past social history, past surgical history and problem list.    Medications:  aspirin  estradiol-norethindrone  ramipril  rosuvastatin    Subjective  Allergies   Allergen Reactions   • Eryc [Erythromycin] Anaphylaxis and Swelling   • Shellfish-Derived Products Anaphylaxis   • Latex Rash        Past Medical History:   Diagnosis Date   • Abnormality of right breast on screening mammogram 11/12/2019   • Anemia    • ASCUS with positive high risk HPV cervical 7/29/2019   • Cervical dysplasia    • Diverticulitis    • Headache     SINUS HEADACHE / MIGRAINE   • HPV (human papilloma virus) infection    • Hypercholesterolemia    • IBS (irritable bowel syndrome)        Past Surgical History:   Procedure Laterality Date   • AUGMENTATION MAMMAPLASTY     • BREAST AUGMENTATION  2004    DOMINIQUE LATERAL   • KNEE ARTHROSCOPY Right 05/20/2019    turned into  much more complicated   • KNEE SURGERY Right 2008   • TOTAL KNEE ARTHROPLASTY Right 05/2020   • TUBAL ABDOMINAL LIGATION  2003       Family History   Problem Relation Age of Onset   • Osteoporosis Mother    • Thyroid disease Mother    • Diabetes Mother    • Heart disease Mother    • Hypertension Mother    • Stroke Mother    • Asthma Mother    • Thyroid disease Father    • Diabetes Father    • Heart disease Father    • Hypertension Father    • Osteoporosis Sister    • Lupus Sister    • Thyroid disease Sister    • Heart disease Sister    • Hypertension Sister    • Colon cancer Maternal Grandmother         late 60's   • Hodgkin's lymphoma Sister         NON   • Skin cancer Brother    • Breast cancer Neg Hx    • Ovarian cancer Neg Hx    • Uterine cancer Neg Hx         Social History     Socioeconomic History   • Marital status:    • Number of children: 2   Tobacco Use   • Smoking status: Current Every Day Smoker     Packs/day: 0.50     Types: Cigarettes   • Smokeless tobacco: Never Used   Vaping Use   • Vaping Use: Never used   Substance and Sexual Activity   • Alcohol use: Yes     Comment: OCC   • Drug use: No   • Sexual activity: Yes     Partners: Male     Birth control/protection: Post-menopausal       Review of Systems   Constitutional: Negative.    HENT: Negative.    Respiratory: Negative.    Cardiovascular: Positive for chest pain.   Endocrine: Negative.    Genitourinary: Negative.    Musculoskeletal: Negative.    Skin: Negative.    Allergic/Immunologic: Negative.    Neurological: Negative.    Hematological: Negative.    Psychiatric/Behavioral: Negative.    All other systems reviewed and are negative.      Cardiovascular Procedures    ECHO/MUGA:   STRESS TESTS:   CARDIAC CATH:   DEVICES:   HOLTER:   CT/MRI:   VASCULAR:   CARDIOTHORACIC:     Objective  Vitals:    12/06/21 1035   BP: 118/74   BP Location: Left arm   Patient Position: Sitting   Cuff Size: Adult   Pulse: 89   Resp: 11   Temp: 97 °F (36.1 °C)  "  TempSrc: Infrared   SpO2: 99%   Weight: 66.7 kg (147 lb)   Height: 157.5 cm (62\")   PainSc:   4   PainLoc: Leg       Physical Exam  Constitutional:       Appearance: Healthy appearance. Not in distress.   Neck:      Vascular: No JVR. JVD normal.   Pulmonary:      Effort: Pulmonary effort is normal.      Breath sounds: Normal breath sounds. No wheezing. No rhonchi. No rales.   Chest:      Chest wall: Not tender to palpatation.   Cardiovascular:      PMI at left midclavicular line. Normal rate. Regular rhythm. Normal S1. Normal S2.      Murmurs: There is no murmur.      No gallop. No click. No rub.   Pulses:     Intact distal pulses.   Edema:     Peripheral edema absent.   Abdominal:      General: Bowel sounds are normal.      Palpations: Abdomen is soft.      Tenderness: There is no abdominal tenderness.   Musculoskeletal: Normal range of motion.         General: No tenderness. Skin:     General: Skin is warm and dry.   Neurological:      General: No focal deficit present.      Mental Status: Alert and oriented to person, place and time.          Diagnostic Data    ECG 12 Lead    Date/Time: 12/6/2021 1:09 PM  Performed by: Bryn Haile MD  Authorized by: Bryn Haile MD   Comparison: not compared with previous ECG   Previous ECG: no previous ECG available  Rhythm: sinus rhythm  Ectopy: atrial premature contractions  Rate: normal  BPM: 69  QRS axis: normal    Clinical impression: normal ECG            Assessment and Plan  Diagnoses and all orders for this visit:    PVD (peripheral vascular disease) with claudication (HCC)- Mild disease by JHONNY , will start ASA    Precordial pain- has some typical and atypical features, will get a stress nuclear , and echo  -     Cancel: Stress Test With Myocardial Perfusion One Day; Future  -     Adult Transthoracic Echo Complete W/ Cont if Necessary Per Protocol; Future  -     Stress Test With Myocardial Perfusion One Day; Future    Tobacco use- Still " smoking    Other orders  -     aspirin (aspirin) 81 MG EC tablet; Take 1 tablet by mouth Daily.  -     ramipril (Altace) 2.5 MG capsule; Take 1 capsule by mouth Daily.        Return in about 2 weeks (around 12/20/2021) for Recheck.    Bryn Haile MD  12/06/2021

## 2021-12-09 ENCOUNTER — PATIENT ROUNDING (BHMG ONLY) (OUTPATIENT)
Dept: CARDIOLOGY | Facility: CLINIC | Age: 56
End: 2021-12-09

## 2021-12-09 NOTE — PROGRESS NOTES
December 9, 2021    Hello, may I speak with Betty Hume?    My name is JIE     I am  with [facility]  [facility]  [address] [place] [address].    Before we get started may I verify your date of birth? 1965    I am calling to officially welcome you to our practice and ask about your recent visit. Is this a good time to talk? yesTell me about your visit with us. What things went well?  yes           We're always looking for ways to make our patients' experiences even better. Do you have recommendations on ways we may improve?  no    Overall were you satisfied with your first visit to our practice? yes       I appreciate you taking the time to speak with me today. Is there anything else I can do for you? no      Thank you, and have a great day.

## 2021-12-09 NOTE — PROGRESS NOTES
December 9, 2021    Hello, may I speak with Betty Hume?    My name is shukri      I am  with [facility]  [facility]  [address] [place] [address].    Before we get started may I verify your date of birth? 1965    I am calling to officially welcome you to our practice and ask about your recent visit. Is this a good time to talk? yes    Tell me about your visit with us. What things went well?  went well       We're always looking for ways to make our patients' experiences even better. Do you have recommendations on ways we may improve?  no not really     Overall were you satisfied with your first visit to our practice? yes       I appreciate you taking the time to speak with me today. Is there anything else I can do for you? no      Thank you, and have a great day.

## 2021-12-09 NOTE — PROGRESS NOTES
December 9, 2021    Hello, may I speak with Betty Hume?    My name is JIE      I am  with [facility]  [facility]  [address] [place] [address].        UNABLE TO CONTACT PATIENT

## 2021-12-20 ENCOUNTER — OFFICE VISIT (OUTPATIENT)
Dept: CARDIOLOGY | Facility: CLINIC | Age: 56
End: 2021-12-20

## 2021-12-20 VITALS
WEIGHT: 147 LBS | HEIGHT: 62 IN | TEMPERATURE: 98 F | DIASTOLIC BLOOD PRESSURE: 78 MMHG | RESPIRATION RATE: 12 BRPM | OXYGEN SATURATION: 99 % | SYSTOLIC BLOOD PRESSURE: 128 MMHG | BODY MASS INDEX: 27.05 KG/M2 | HEART RATE: 77 BPM

## 2021-12-20 DIAGNOSIS — R07.2 PRECORDIAL PAIN: ICD-10-CM

## 2021-12-20 DIAGNOSIS — Z72.0 TOBACCO USE: ICD-10-CM

## 2021-12-20 DIAGNOSIS — I73.9 PVD (PERIPHERAL VASCULAR DISEASE) WITH CLAUDICATION (HCC): Primary | ICD-10-CM

## 2021-12-20 PROCEDURE — 99214 OFFICE O/P EST MOD 30 MIN: CPT | Performed by: INTERNAL MEDICINE

## 2021-12-20 NOTE — PROGRESS NOTES
[facility]  [facility]  [address] [place] [address]  Dept: [phone]  Dept Fax: [phone]    Betty Hume  1965    Follow Up Office Visit Note    History of Present Illness:  Betty Hume is a 56 y.o. female who presents to the clinic for Chest pain , She underwent a stress nuclear and this is normal, . She is not longer having any chest pain, her echo was also normal, BP is good 125.80, only on Altace 2,5 mg ., she has not been able to tolerate Crestor, will use Livalo 2 mg, has had muscle pain with multiples statins     The following portions of the patient's history were reviewed and updated as appropriate: allergies, current medications, past family history, past medical history, past social history, past surgical history and problem list.    Medications:  aspirin  estradiol-norethindrone  pitavastatin calcium tablet  ramipril    Subjective  Allergies   Allergen Reactions   • Eryc [Erythromycin] Anaphylaxis and Swelling   • Shellfish-Derived Products Anaphylaxis   • Latex Rash        Past Medical History:   Diagnosis Date   • Abnormality of right breast on screening mammogram 11/12/2019   • Anemia    • ASCUS with positive high risk HPV cervical 7/29/2019   • Cervical dysplasia    • Diverticulitis    • Headache     SINUS HEADACHE / MIGRAINE   • HPV (human papilloma virus) infection    • Hypercholesterolemia    • IBS (irritable bowel syndrome)        Past Surgical History:   Procedure Laterality Date   • AUGMENTATION MAMMAPLASTY     • BREAST AUGMENTATION  2004    DOMINIQUE LATERAL   • KNEE ARTHROSCOPY Right 05/20/2019    turned into much more complicated   • KNEE SURGERY Right 2008   • TOTAL KNEE ARTHROPLASTY Right 05/2020   • TUBAL ABDOMINAL LIGATION  2003       Family History   Problem Relation Age of Onset   • Osteoporosis Mother    • Thyroid disease Mother    • Diabetes Mother    • Heart disease Mother    • Hypertension Mother    • Stroke Mother    • Asthma  "Mother    • Thyroid disease Father    • Diabetes Father    • Heart disease Father    • Hypertension Father    • Osteoporosis Sister    • Lupus Sister    • Thyroid disease Sister    • Heart disease Sister    • Hypertension Sister    • Colon cancer Maternal Grandmother         late 60's   • Hodgkin's lymphoma Sister         NON   • Skin cancer Brother    • Breast cancer Neg Hx    • Ovarian cancer Neg Hx    • Uterine cancer Neg Hx         Social History     Socioeconomic History   • Marital status:    • Number of children: 2   Tobacco Use   • Smoking status: Current Every Day Smoker     Packs/day: 0.50     Types: Cigarettes   • Smokeless tobacco: Never Used   Vaping Use   • Vaping Use: Never used   Substance and Sexual Activity   • Alcohol use: Yes     Comment: OCC   • Drug use: No   • Sexual activity: Yes     Partners: Male     Birth control/protection: Post-menopausal       Review of Systems   Constitutional: Negative.    HENT: Negative.    Respiratory: Negative.    Cardiovascular: Negative.    Endocrine: Negative.    Genitourinary: Negative.    Musculoskeletal: Negative.    Skin: Negative.    Allergic/Immunologic: Negative.    Neurological: Negative.    Hematological: Negative.    Psychiatric/Behavioral: Negative.    All other systems reviewed and are negative.      Cardiovascular Procedures    ECHO/MUGA:   STRESS TESTS:   CARDIAC CATH:   DEVICES:   HOLTER:   CT/MRI:   VASCULAR:   CARDIOTHORACIC:     Objective  Vitals:    12/20/21 1541   BP: 128/78   BP Location: Left arm   Patient Position: Sitting   Cuff Size: Adult   Pulse: 77   Resp: 12   Temp: 98 °F (36.7 °C)   TempSrc: Infrared   SpO2: 99%   Weight: 66.7 kg (147 lb)   Height: 157.5 cm (62\")   PainSc: 0-No pain     Body mass index is 26.89 kg/m².     Physical Exam  Constitutional:       Appearance: Healthy appearance. Not in distress.   Neck:      Vascular: No JVR. JVD normal.   Pulmonary:      Effort: Pulmonary effort is normal.      Breath sounds: " Normal breath sounds. No wheezing. No rhonchi. No rales.   Chest:      Chest wall: Not tender to palpatation.   Cardiovascular:      PMI at left midclavicular line. Normal rate. Regular rhythm. Normal S1. Normal S2.      Murmurs: There is no murmur.      No gallop. No click. No rub.   Pulses:     Intact distal pulses.   Edema:     Peripheral edema absent.   Abdominal:      General: Bowel sounds are normal.      Palpations: Abdomen is soft.      Tenderness: There is no abdominal tenderness.   Musculoskeletal: Normal range of motion.         General: No tenderness. Skin:     General: Skin is warm and dry.   Neurological:      General: No focal deficit present.      Mental Status: Alert and oriented to person, place and time.          Diagnostic Data  Procedures    Assessment and Plan  Diagnoses and all orders for this visit:    PVD (peripheral vascular disease) with claudication (HCC)-Mild disease by JHONNY on ASA    Precordial pain- Stress nuclear normal     Tobacco use- Advised to quit smoking  Hypertension- Bp is good on Altace 2,5 mg    Other orders  -     Discontinue: pitavastatin calcium (Livalo) 2 MG tablet tablet; Take 1 tablet by mouth Every Night.  -     pitavastatin calcium (Livalo) 2 MG tablet tablet; Take 1 tablet by mouth Every Night.         Return in about 6 months (around 6/20/2022) for Recheck.    Bryn Haile MD  12/20/2021

## 2022-03-23 ENCOUNTER — TELEPHONE (OUTPATIENT)
Dept: FAMILY MEDICINE CLINIC | Facility: CLINIC | Age: 57
End: 2022-03-23

## 2022-03-23 RX ORDER — OMEPRAZOLE 20 MG/1
20 CAPSULE, DELAYED RELEASE ORAL DAILY
Qty: 90 CAPSULE | Refills: 0 | Status: SHIPPED | OUTPATIENT
Start: 2022-03-23 | End: 2022-12-06 | Stop reason: SDUPTHER

## 2022-04-07 RX ORDER — ESTRADIOL AND NORETHINDRONE ACETATE 1; .5 MG/1; MG/1
TABLET, FILM COATED ORAL
Qty: 84 TABLET | Refills: 0 | Status: SHIPPED | OUTPATIENT
Start: 2022-04-07 | End: 2022-05-12

## 2022-05-12 ENCOUNTER — OFFICE VISIT (OUTPATIENT)
Dept: FAMILY MEDICINE CLINIC | Facility: CLINIC | Age: 57
End: 2022-05-12

## 2022-05-12 VITALS
SYSTOLIC BLOOD PRESSURE: 118 MMHG | DIASTOLIC BLOOD PRESSURE: 86 MMHG | HEIGHT: 61 IN | BODY MASS INDEX: 27.4 KG/M2 | OXYGEN SATURATION: 99 % | WEIGHT: 145.1 LBS | HEART RATE: 85 BPM

## 2022-05-12 DIAGNOSIS — F41.9 ANXIETY AND DEPRESSION: ICD-10-CM

## 2022-05-12 DIAGNOSIS — Z12.31 ENCOUNTER FOR SCREENING MAMMOGRAM FOR MALIGNANT NEOPLASM OF BREAST: ICD-10-CM

## 2022-05-12 DIAGNOSIS — Z13.21 ENCOUNTER FOR VITAMIN DEFICIENCY SCREENING: ICD-10-CM

## 2022-05-12 DIAGNOSIS — K59.00 CONSTIPATION, UNSPECIFIED CONSTIPATION TYPE: ICD-10-CM

## 2022-05-12 DIAGNOSIS — Z72.0 TOBACCO USE: ICD-10-CM

## 2022-05-12 DIAGNOSIS — F32.A ANXIETY AND DEPRESSION: ICD-10-CM

## 2022-05-12 DIAGNOSIS — I73.9 PVD (PERIPHERAL VASCULAR DISEASE) WITH CLAUDICATION: ICD-10-CM

## 2022-05-12 DIAGNOSIS — M25.50 ARTHRALGIA, UNSPECIFIED JOINT: ICD-10-CM

## 2022-05-12 DIAGNOSIS — Z12.11 SCREENING FOR COLON CANCER: ICD-10-CM

## 2022-05-12 DIAGNOSIS — E78.2 MIXED HYPERLIPIDEMIA: ICD-10-CM

## 2022-05-12 DIAGNOSIS — Z00.00 ANNUAL PHYSICAL EXAM: Primary | ICD-10-CM

## 2022-05-12 DIAGNOSIS — I10 BENIGN ESSENTIAL HTN: ICD-10-CM

## 2022-05-12 PROBLEM — Z12.39 SCREENING FOR BREAST CANCER: Status: ACTIVE | Noted: 2022-05-12

## 2022-05-12 LAB
BILIRUB BLD-MCNC: NEGATIVE MG/DL
CLARITY, POC: CLEAR
COLOR UR: YELLOW
EXPIRATION DATE: NORMAL
GLUCOSE UR STRIP-MCNC: NEGATIVE MG/DL
KETONES UR QL: NEGATIVE
LEUKOCYTE EST, POC: NEGATIVE
Lab: NORMAL
NITRITE UR-MCNC: NEGATIVE MG/ML
PH UR: 5.5 [PH] (ref 5–8)
PROT UR STRIP-MCNC: NEGATIVE MG/DL
RBC # UR STRIP: NEGATIVE /UL
SP GR UR: 1.02 (ref 1–1.03)
UROBILINOGEN UR QL: NORMAL

## 2022-05-12 PROCEDURE — 81003 URINALYSIS AUTO W/O SCOPE: CPT | Performed by: NURSE PRACTITIONER

## 2022-05-12 PROCEDURE — 99396 PREV VISIT EST AGE 40-64: CPT | Performed by: NURSE PRACTITIONER

## 2022-05-12 PROCEDURE — 36415 COLL VENOUS BLD VENIPUNCTURE: CPT | Performed by: NURSE PRACTITIONER

## 2022-05-12 RX ORDER — DULOXETIN HYDROCHLORIDE 20 MG/1
20 CAPSULE, DELAYED RELEASE ORAL 2 TIMES DAILY
Qty: 60 CAPSULE | Refills: 1 | Status: SHIPPED | OUTPATIENT
Start: 2022-05-12 | End: 2022-06-20

## 2022-05-12 RX ORDER — MELOXICAM 7.5 MG/1
7.5 TABLET ORAL DAILY
Qty: 30 TABLET | Refills: 2 | Status: SHIPPED | OUTPATIENT
Start: 2022-05-12 | End: 2022-09-12

## 2022-05-12 NOTE — ASSESSMENT & PLAN NOTE
We will try Cymbalta.  PHQ-9 completed and discussed.  No thoughts of suicide or hurting herself or anyone else.  Risk of meds discussed and understood.  Follow-up 1 month, sooner if needed.  Return to clinic or ED with any issues or concerns.

## 2022-05-12 NOTE — ASSESSMENT & PLAN NOTE
Fasting labs drawn.  UA completed.  Denies refills of any medications.  We will schedule mammogram.  She will follow-up with OB/GYN regarding Pap smears.  Will refer to GI for the constipation and colonoscopy.  Smoking cessation discussed and encouraged.  She denied pneumonia vaccines.  We discussed shingles vaccine and she will discuss with her pharmacist.  Proper diet and exercise plan discussed and encouraged.  Follow-up with cardiology as scheduled.  Return to clinic or ED with any issues or concerns.

## 2022-05-12 NOTE — PROGRESS NOTES
Chief Complaint  Joint Pain    Subjective          Betty Hume presents to [facility] for preventative yearly exam.   History of Present Illness     Patient presents for annual exam.  She smokes about 6 cigarettes a day for less than 20 years.  No alcohol use.  Past history of CRIS ALEKS and follows up with her cardiologist DrAmelie Her radio regularly.  She is on daily baby aspirin.  Hypertension well-controlled on ramipril.  She does have hyperlipidemia but she cannot tolerate the statins.  States Dr. Hidalgo recently started her on Livalo but she states that it caused her the muscle aches and cramps so she stopped it.  She will discuss this and let Dr. Her radio know if this.  She has been trying to watch her diet better since she cannot take statins and exercise more.  No dizziness no headache no chest pain no chest pressure no shortness of breath no trouble breathing no urinary issues.    States for the past couple months she has had worsening anxiety and depression.  States there have been no real major life changes other than she is going to be starting a new job coming up which she is looking forward to as she dislikes her current job.  No thoughts of suicide or hurting herself or anyone else.  States her sisters have taken Cymbalta and have had good luck with this so she is hoping to try that.    She also has complaints of multiple joint aches and pains.  States aches and pains in her knees hips back arms wrist pretty much all joints.  States she has had her right knee replaced and is needing her left knee replaced also but is not wanting to do that.  States her sisters have a history of RA and lupus so she is wondering if she may have 1 of those.  States sister is doing Mobic and doing well on it so she is hoping she can maybe try something like that    Also states for as long as she can remember she has had issues with constipation.  States she will go a week at a time without having  "a bowel movement.  Again states she has had a sister that is use Linzess and it is worked well for her so she is hoping she can try that.  States she feels like she is drinking plenty of water.  Has tried MiraLAX in the past and states she thinks that she should probably just be taking it every day.  She states it has been a while since she has had a colonoscopy so I will start referral to GI for evaluation of the constipation and her colonoscopy.    She is also currently following up with dermatology as she states they just recently removed a basal cell spot from her face.    She is due a mammogram.    States her gynecologist is Dr. Lemon and she will follow-up with him.  States she has had a Pap smear within the past 2 years as they monitor this.    She is fasting today.     Objective   Vital Signs:   /86   Pulse 85   Ht 154.9 cm (61\")   Wt 65.8 kg (145 lb 1.6 oz)   SpO2 99%   BMI 27.42 kg/m²     Body mass index is 27.42 kg/m².    Predictive Model Details   No score data available for Risk of Fall        PHQ-9 Depression Screening  Little interest or pleasure in doing things? 3-->nearly every day   Feeling down, depressed, or hopeless? 3-->nearly every day   Trouble falling or staying asleep, or sleeping too much? 3-->nearly every day   Feeling tired or having little energy? 3-->nearly every day   Poor appetite or overeating? 3-->nearly every day   Feeling bad about yourself - or that you are a failure or have let yourself or your family down? 2-->more than half the days   Trouble concentrating on things, such as reading the newspaper or watching television? 2-->more than half the days   Moving or speaking so slowly that other people could have noticed? Or the opposite - being so fidgety or restless that you have been moving around a lot more than usual? 2-->more than half the days   Thoughts that you would be better off dead, or of hurting yourself in some way? 0-->not at all   PHQ-9 Total Score 21   If " you checked off any problems, how difficult have these problems made it for you to do your work, take care of things at home, or get along with other people? not difficult at all     Patient screened positive for depression based on a PHQ-9 score of 21 on 5/12/2022. Follow-up recommendations include: PCP managing depression and Prescribed antidepressant medication treatment.       Immunization History   Administered Date(s) Administered   • COVID-19 (MODERNA) 1st, 2nd, 3rd Dose Only 10/11/2021, 01/16/2022       Review of Systems   Constitutional: Negative for chills, fatigue, fever, unexpected weight gain and unexpected weight loss.   HENT: Negative for congestion, sinus pressure, sneezing and sore throat.    Eyes: Negative for blurred vision.   Respiratory: Negative for cough, shortness of breath and wheezing.    Cardiovascular: Negative.    Gastrointestinal: Positive for constipation. Negative for abdominal distention, abdominal pain, anal bleeding, blood in stool, diarrhea, nausea, rectal pain, vomiting, GERD and indigestion.   Endocrine: Negative for polydipsia, polyphagia and polyuria.   Genitourinary: Negative for decreased urine volume, dysuria and urgency.   Musculoskeletal: Positive for arthralgias. Negative for joint swelling and myalgias.   Skin: Negative for rash and wound.   Neurological: Negative for dizziness, tremors, weakness, light-headedness, headache and memory problem.   Psychiatric/Behavioral: Negative.  Positive for stress. Negative for suicidal ideas.       Past History:  Medical History: has a past medical history of Abnormality of right breast on screening mammogram (11/12/2019), Anemia, ASCUS with positive high risk HPV cervical (7/29/2019), Cervical dysplasia, Diverticulitis, Headache, HPV (human papilloma virus) infection, Hypercholesterolemia, and IBS (irritable bowel syndrome).   Surgical History: has a past surgical history that includes Knee surgery (Right, 2008); Tubal ligation  (2003); Breast Augmentation (2004); Knee arthroscopy (Right, 05/20/2019); Augmentation mammaplasty; and Total knee arthroplasty (Right, 05/2020).   Family History: family history includes Asthma in her mother; Colon cancer in her maternal grandmother; Diabetes in her father and mother; Heart disease in her father, mother, and sister; Hodgkin's lymphoma in her sister; Hypertension in her father, mother, and sister; Lupus in her sister; Osteoporosis in her mother and sister; Skin cancer in her brother; Stroke in her mother; Thyroid disease in her father, mother, and sister.   Social History: reports that she has been smoking cigarettes. She has been smoking about 0.50 packs per day. She has never used smokeless tobacco. She reports current alcohol use. She reports that she does not use drugs.      Current Outpatient Medications:   •  aspirin (aspirin) 81 MG EC tablet, Take 1 tablet by mouth Daily., Disp: 30 tablet, Rfl: 0  •  DULoxetine (Cymbalta) 20 MG capsule, Take 1 capsule by mouth 2 (Two) Times a Day., Disp: 60 capsule, Rfl: 1  •  linaclotide (Linzess) 145 MCG capsule capsule, Take 1 capsule by mouth Every Morning Before Breakfast., Disp: 30 capsule, Rfl: 0  •  meloxicam (Mobic) 7.5 MG tablet, Take 1 tablet by mouth Daily., Disp: 30 tablet, Rfl: 2  •  omeprazole (priLOSEC) 20 MG capsule, Take 1 capsule by mouth Daily., Disp: 90 capsule, Rfl: 0  •  ramipril (Altace) 2.5 MG capsule, Take 1 capsule by mouth Daily., Disp: 30 capsule, Rfl: 0   Allergies: Eryc [erythromycin], Shellfish-derived products, and Latex    Physical Exam  Constitutional:       Appearance: Normal appearance.   HENT:      Head: Normocephalic.      Right Ear: Tympanic membrane, ear canal and external ear normal.      Left Ear: Tympanic membrane, ear canal and external ear normal.      Nose: Nose normal.      Mouth/Throat:      Mouth: Mucous membranes are moist.      Pharynx: Oropharynx is clear.   Eyes:      Extraocular Movements: Extraocular  movements intact.      Conjunctiva/sclera: Conjunctivae normal.      Pupils: Pupils are equal, round, and reactive to light.   Cardiovascular:      Rate and Rhythm: Normal rate and regular rhythm.      Heart sounds: Normal heart sounds.   Pulmonary:      Effort: Pulmonary effort is normal.      Breath sounds: Normal breath sounds.   Abdominal:      General: Abdomen is flat. Bowel sounds are normal. There is no distension.      Palpations: Abdomen is soft.      Tenderness: There is no abdominal tenderness. There is no guarding or rebound.   Musculoskeletal:         General: Normal range of motion.   Skin:     General: Skin is warm.   Neurological:      General: No focal deficit present.      Mental Status: She is alert and oriented to person, place, and time. Mental status is at baseline.   Psychiatric:         Attention and Perception: Attention and perception normal.         Mood and Affect: Mood and affect normal.         Speech: Speech normal.         Behavior: Behavior normal. Behavior is cooperative.         Thought Content: Thought content does not include homicidal or suicidal ideation. Thought content does not include homicidal or suicidal plan.         Cognition and Memory: Cognition and memory normal.         Judgment: Judgment normal.          Result Review :                     Assessment and Plan    Diagnoses and all orders for this visit:    1. Annual physical exam (Primary)  Assessment & Plan:  Fasting labs drawn.  UA completed.  Denies refills of any medications.  We will schedule mammogram.  She will follow-up with OB/GYN regarding Pap smears.  Will refer to GI for the constipation and colonoscopy.  Smoking cessation discussed and encouraged.  She denied pneumonia vaccines.  We discussed shingles vaccine and she will discuss with her pharmacist.  Proper diet and exercise plan discussed and encouraged.  Follow-up with cardiology as scheduled.  Return to clinic or ED with any issues or  concerns.    Orders:  -     CBC & Differential; Future  -     Comprehensive Metabolic Panel; Future  -     TSH Rfx On Abnormal To Free T4; Future  -     POC Urinalysis Dipstick, Automated; Future  -     CBC & Differential  -     Comprehensive Metabolic Panel  -     TSH Rfx On Abnormal To Free T4  -     POC Urinalysis Dipstick, Automated    2. Anxiety and depression  Assessment & Plan:  We will try Cymbalta.  PHQ-9 completed and discussed.  No thoughts of suicide or hurting herself or anyone else.  Risk of meds discussed and understood.  Follow-up 1 month, sooner if needed.  Return to clinic or ED with any issues or concerns.    Orders:  -     DULoxetine (Cymbalta) 20 MG capsule; Take 1 capsule by mouth 2 (Two) Times a Day.  Dispense: 60 capsule; Refill: 1    3. Arthralgia, unspecified joint  Assessment & Plan:  We will check some labs today.  We will try a course of Mobic.  RICE encouraged.  May need to see a specialist depending on if lab results show anything.  Risk discussed and understood.  Education provided.  Return to clinic or ED with any issues or concerns.    Orders:  -     meloxicam (Mobic) 7.5 MG tablet; Take 1 tablet by mouth Daily.  Dispense: 30 tablet; Refill: 2  -     CORNELIUS; Future  -     C-reactive Protein; Future  -     Rheumatoid Factor; Future  -     Sedimentation Rate; Future  -     CORNELIUS  -     C-reactive Protein  -     Rheumatoid Factor  -     Sedimentation Rate    4. Benign essential HTN  Assessment & Plan:  Continue medications.  Follow-up with cardiology as scheduled.  Goal blood pressure less than 140/90.  Let me or cardiology know if gets 2 or above that.  Proper diet and exercise plan discussed and encouraged.  Return to clinic or ED with any issues or concerns.    Orders:  -     Lipid Panel; Future  -     Lipid Panel    5. Mixed hyperlipidemia  Assessment & Plan:  Proper diet and exercise plan discussed and encouraged.  She will contact Dr. Haile to let him know that she was not able to  tolerate the Livalo to see what he wants to do as the next step for her cholesterol levels.      6. PVD (peripheral vascular disease) with claudication (HCC)  Assessment & Plan:  Follow-up with cardio.      7. Constipation, unspecified constipation type  Assessment & Plan:  Proper diet and exercise plan discussed and encouraged.  We will try some Linzess.  Water encouraged.  She may try using some MiraLAX also.  Will refer to GI for further evaluation.  Return to clinic or ED with any issues or concerns.    Orders:  -     linaclotide (Linzess) 145 MCG capsule capsule; Take 1 capsule by mouth Every Morning Before Breakfast.  Dispense: 30 capsule; Refill: 0  -     Ambulatory Referral to Gastroenterology    8. Screening for colon cancer  Assessment & Plan:  Refer to GI.      9. Encounter for screening mammogram for malignant neoplasm of breast  Assessment & Plan:  We will schedule mammogram.    Orders:  -     Mammo Screening Bilateral With CAD; Future    10. Encounter for vitamin deficiency screening  -     Vitamin B12; Future  -     Vitamin D 25 Hydroxy; Future  -     Vitamin B12  -     Vitamin D 25 Hydroxy    11. Tobacco use  Assessment & Plan:  Smoking cessation discussed and encouraged.                     Follow Up   Return in about 4 weeks (around 6/9/2022).  Patient was given instructions and counseling regarding her condition or for health maintenance advice. Please see specific information pulled into the AVS if appropriate.     LARY Villatoro

## 2022-05-12 NOTE — ASSESSMENT & PLAN NOTE
We will check some labs today.  We will try a course of Roselia.  LETITIA encouraged.  May need to see a specialist depending on if lab results show anything.  Risk discussed and understood.  Education provided.  Return to clinic or ED with any issues or concerns.

## 2022-05-12 NOTE — ASSESSMENT & PLAN NOTE
Proper diet and exercise plan discussed and encouraged.  We will try some Linzess.  Water encouraged.  She may try using some MiraLAX also.  Will refer to GI for further evaluation.  Return to clinic or ED with any issues or concerns.

## 2022-05-12 NOTE — ASSESSMENT & PLAN NOTE
Continue medications.  Follow-up with cardiology as scheduled.  Goal blood pressure less than 140/90.  Let me or cardiology know if gets 2 or above that.  Proper diet and exercise plan discussed and encouraged.  Return to clinic or ED with any issues or concerns.

## 2022-05-14 LAB
25(OH)D3+25(OH)D2 SERPL-MCNC: 24.2 NG/ML (ref 30–100)
ALBUMIN SERPL-MCNC: 4.9 G/DL (ref 3.8–4.9)
ALBUMIN/GLOB SERPL: 2 {RATIO} (ref 1.2–2.2)
ALP SERPL-CCNC: 80 IU/L (ref 44–121)
ALT SERPL-CCNC: 16 IU/L (ref 0–32)
AST SERPL-CCNC: 20 IU/L (ref 0–40)
BASOPHILS # BLD AUTO: 0.1 X10E3/UL (ref 0–0.2)
BASOPHILS NFR BLD AUTO: 2 %
BILIRUB SERPL-MCNC: <0.2 MG/DL (ref 0–1.2)
BUN SERPL-MCNC: 17 MG/DL (ref 6–24)
BUN/CREAT SERPL: 28 (ref 9–23)
CALCIUM SERPL-MCNC: 9.7 MG/DL (ref 8.7–10.2)
CHLORIDE SERPL-SCNC: 99 MMOL/L (ref 96–106)
CHOLEST SERPL-MCNC: 271 MG/DL (ref 100–199)
CO2 SERPL-SCNC: 24 MMOL/L (ref 20–29)
CREAT SERPL-MCNC: 0.61 MG/DL (ref 0.57–1)
CRP SERPL-MCNC: 4 MG/L (ref 0–10)
EGFRCR SERPLBLD CKD-EPI 2021: 105 ML/MIN/1.73
EOSINOPHIL # BLD AUTO: 0.3 X10E3/UL (ref 0–0.4)
EOSINOPHIL NFR BLD AUTO: 3 %
ERYTHROCYTE [DISTWIDTH] IN BLOOD BY AUTOMATED COUNT: 13 % (ref 11.7–15.4)
ERYTHROCYTE [SEDIMENTATION RATE] IN BLOOD BY WESTERGREN METHOD: NORMAL MM/H
GLOBULIN SER CALC-MCNC: 2.4 G/DL (ref 1.5–4.5)
GLUCOSE SERPL-MCNC: 94 MG/DL (ref 65–99)
HCT VFR BLD AUTO: 43.2 % (ref 34–46.6)
HDLC SERPL-MCNC: 39 MG/DL
HGB BLD-MCNC: 14.6 G/DL (ref 11.1–15.9)
IMM GRANULOCYTES # BLD AUTO: 0 X10E3/UL (ref 0–0.1)
IMM GRANULOCYTES NFR BLD AUTO: 0 %
LABORATORY COMMENT REPORT: ABNORMAL
LDLC SERPL CALC-MCNC: 200 MG/DL (ref 0–99)
LYMPHOCYTES # BLD AUTO: 2.8 X10E3/UL (ref 0.7–3.1)
LYMPHOCYTES NFR BLD AUTO: 31 %
MCH RBC QN AUTO: 31.7 PG (ref 26.6–33)
MCHC RBC AUTO-ENTMCNC: 33.8 G/DL (ref 31.5–35.7)
MCV RBC AUTO: 94 FL (ref 79–97)
MONOCYTES # BLD AUTO: 0.7 X10E3/UL (ref 0.1–0.9)
MONOCYTES NFR BLD AUTO: 8 %
NEUTROPHILS # BLD AUTO: 5.2 X10E3/UL (ref 1.4–7)
NEUTROPHILS NFR BLD AUTO: 56 %
PLATELET # BLD AUTO: 298 X10E3/UL (ref 150–450)
POTASSIUM SERPL-SCNC: 3.8 MMOL/L (ref 3.5–5.2)
PROT SERPL-MCNC: 7.3 G/DL (ref 6–8.5)
RBC # BLD AUTO: 4.61 X10E6/UL (ref 3.77–5.28)
SODIUM SERPL-SCNC: 139 MMOL/L (ref 134–144)
TRIGL SERPL-MCNC: 168 MG/DL (ref 0–149)
TSH SERPL DL<=0.005 MIU/L-ACNC: 2.4 UIU/ML (ref 0.45–4.5)
VIT B12 SERPL-MCNC: 499 PG/ML (ref 232–1245)
VLDLC SERPL CALC-MCNC: 32 MG/DL (ref 5–40)
WBC # BLD AUTO: 9.2 X10E3/UL (ref 3.4–10.8)

## 2022-05-16 ENCOUNTER — TELEPHONE (OUTPATIENT)
Dept: FAMILY MEDICINE CLINIC | Facility: CLINIC | Age: 57
End: 2022-05-16

## 2022-05-16 LAB — ANA SER QL: NEGATIVE

## 2022-05-16 RX ORDER — ATORVASTATIN CALCIUM 20 MG/1
20 TABLET, FILM COATED ORAL DAILY
Qty: 90 TABLET | Refills: 0 | Status: SHIPPED | OUTPATIENT
Start: 2022-05-16 | End: 2022-05-16

## 2022-05-16 NOTE — TELEPHONE ENCOUNTER
Looks like allergy was already put in pt chart  Please see message from Usama on watching diet. Message left

## 2022-05-17 NOTE — TELEPHONE ENCOUNTER
Pt called back. I could not tell her anything because I didn't see anything in a TELEPHONE encounter to tell the patient. [facility] won't allow us to give messages unless they are in a telephone message. Please call pt back. Pt gave permission to leave a detailed voice message

## 2022-05-20 DIAGNOSIS — K59.00 CONSTIPATION, UNSPECIFIED CONSTIPATION TYPE: ICD-10-CM

## 2022-05-23 RX ORDER — LINACLOTIDE 145 UG/1
CAPSULE, GELATIN COATED ORAL
Qty: 30 CAPSULE | Refills: 11 | OUTPATIENT
Start: 2022-05-23

## 2022-05-27 ENCOUNTER — OFFICE VISIT (OUTPATIENT)
Dept: FAMILY MEDICINE CLINIC | Facility: CLINIC | Age: 57
End: 2022-05-27

## 2022-05-27 VITALS
BODY MASS INDEX: 27.21 KG/M2 | SYSTOLIC BLOOD PRESSURE: 124 MMHG | DIASTOLIC BLOOD PRESSURE: 76 MMHG | HEART RATE: 73 BPM | WEIGHT: 144 LBS | OXYGEN SATURATION: 98 %

## 2022-05-27 DIAGNOSIS — M25.50 ARTHRALGIA, UNSPECIFIED JOINT: Primary | ICD-10-CM

## 2022-05-27 PROCEDURE — 99213 OFFICE O/P EST LOW 20 MIN: CPT | Performed by: NURSE PRACTITIONER

## 2022-05-27 PROCEDURE — 36415 COLL VENOUS BLD VENIPUNCTURE: CPT | Performed by: NURSE PRACTITIONER

## 2022-05-27 NOTE — ASSESSMENT & PLAN NOTE
We will fill out [place] paperwork to cover her missed days due to needing appointments with the specialist.  Today we will recheck a sed rate and rheumatoid factor as it was not able to be tested last time we did blood work on her.  We will go ahead and refer to rheumatologist.  Education provided.  Return to clinic or ED with any issues or concerns.

## 2022-05-27 NOTE — PROGRESS NOTES
Chief Complaint  FMLA    Subjective          Betty Hume presents to [facility]  History of Present Illness     Patient presents needing to have repeat blood work for sed rate and rheumatoid factor.  Family history of autoimmune disorders and she does have joint aches and pains.  We checked this last time she was here but the lab was unable to run the stool test.  She would also like me to go ahead and start the referral to the rheumatologist.    Also needs [facility] paperwork filled out.  States her work is a [employer] system and she has appointments coming up for cardiology dermatology colonoscopy a Pap smear and rheumatology and orthopedics for her knee and is needing [facility] to cover these days for appointments.    Doing well today with no issues or concerns.  Leaves for vacation tomorrow.    Objective   Vital Signs:   /76   Pulse 73   Wt 65.3 kg (144 lb)   SpO2 98%   BMI 27.21 kg/m²     Body mass index is 27.21 kg/m².    Review of Systems   Constitutional: Negative for chills and fatigue.   Respiratory: Negative for cough, shortness of breath and wheezing.    Cardiovascular: Negative for chest pain.   Musculoskeletal: Positive for arthralgias.   Neurological: Negative for dizziness and headache.       Past History:  Medical History: has a past medical history of Abnormality of right breast on screening mammogram (11/12/2019), Anemia, ASCUS with positive high risk HPV cervical (7/29/2019), Cervical dysplasia, Diverticulitis, Headache, HPV (human papilloma virus) infection, Hypercholesterolemia, and IBS (irritable bowel syndrome).   Surgical History: has a past surgical history that includes Knee surgery (Right, 2008); Tubal ligation (2003); Breast Augmentation (2004); Knee arthroscopy (Right, 05/20/2019); Augmentation mammaplasty; and Total knee arthroplasty (Right, 05/2020).   Family History: family history includes Asthma in her mother; Colon cancer in her maternal grandmother;  Diabetes in her father and mother; Heart disease in her father, mother, and sister; Hodgkin's lymphoma in her sister; Hypertension in her father, mother, and sister; Lupus in her sister; Osteoporosis in her mother and sister; Skin cancer in her brother; Stroke in her mother; Thyroid disease in her father, mother, and sister.   Social History: reports that she has been smoking cigarettes. She has been smoking about 0.50 packs per day. She has never used smokeless tobacco. She reports current alcohol use. She reports that she does not use drugs.          Current Outpatient Medications:   •  aspirin (aspirin) 81 MG EC tablet, Take 1 tablet by mouth Daily., Disp: 30 tablet, Rfl: 0  •  linaclotide (Linzess) 145 MCG capsule capsule, Take 1 capsule by mouth Every Morning Before Breakfast., Disp: 30 capsule, Rfl: 0  •  meloxicam (Mobic) 7.5 MG tablet, Take 1 tablet by mouth Daily., Disp: 30 tablet, Rfl: 2  •  omeprazole (priLOSEC) 20 MG capsule, Take 1 capsule by mouth Daily., Disp: 90 capsule, Rfl: 0  •  ramipril (Altace) 2.5 MG capsule, Take 1 capsule by mouth Daily., Disp: 30 capsule, Rfl: 0  •  DULoxetine (Cymbalta) 20 MG capsule, Take 1 capsule by mouth 2 (Two) Times a Day., Disp: 60 capsule, Rfl: 1   (Not in a hospital admission)     Allergies: Eryc [erythromycin], Shellfish-derived products, Statins, and Latex    Physical Exam  Constitutional:       Appearance: Normal appearance.   Cardiovascular:      Rate and Rhythm: Normal rate and regular rhythm.      Heart sounds: Normal heart sounds.   Pulmonary:      Effort: Pulmonary effort is normal.      Breath sounds: Normal breath sounds.   Neurological:      General: No focal deficit present.      Mental Status: She is alert and oriented to person, place, and time. Mental status is at baseline.   Psychiatric:         Mood and Affect: Mood normal.         Behavior: Behavior normal.         Thought Content: Thought content normal.         Judgment: Judgment normal.           Result Review :                   Assessment and Plan    Diagnoses and all orders for this visit:    1. Arthralgia, unspecified joint (Primary)  Assessment & Plan:  We will fill out FMLA paperwork to cover her missed days due to needing appointments with the specialist.  Today we will recheck a sed rate and rheumatoid factor as it was not able to be tested last time we did blood work on her.  We will go ahead and refer to rheumatologist.  Education provided.  Return to clinic or ED with any issues or concerns.    Orders:  -     Sedimentation Rate; Future  -     Rheumatoid Factor; Future  -     Ambulatory Referral to Rheumatology            BMI has not been calculated during today's encounter.        Follow Up   Return if symptoms worsen or fail to improve.  Patient was given instructions and counseling regarding her condition or for health maintenance advice. Please see specific information pulled into the AVS if appropriate.     LARY Villatoro

## 2022-05-28 LAB
ERYTHROCYTE [SEDIMENTATION RATE] IN BLOOD BY WESTERGREN METHOD: 17 MM/HR (ref 0–40)
SPECIMEN STATUS: NORMAL

## 2022-05-29 LAB — RHEUMATOID FACT SERPL-ACNC: <10 IU/ML

## 2022-06-01 ENCOUNTER — TELEPHONE (OUTPATIENT)
Dept: FAMILY MEDICINE CLINIC | Facility: CLINIC | Age: 57
End: 2022-06-01

## 2022-06-20 ENCOUNTER — OFFICE VISIT (OUTPATIENT)
Dept: CARDIOLOGY | Facility: CLINIC | Age: 57
End: 2022-06-20

## 2022-06-20 VITALS
TEMPERATURE: 98 F | BODY MASS INDEX: 27.38 KG/M2 | DIASTOLIC BLOOD PRESSURE: 76 MMHG | HEIGHT: 61 IN | HEART RATE: 71 BPM | OXYGEN SATURATION: 99 % | SYSTOLIC BLOOD PRESSURE: 128 MMHG | WEIGHT: 145 LBS | RESPIRATION RATE: 18 BRPM

## 2022-06-20 DIAGNOSIS — I10 BENIGN ESSENTIAL HTN: Primary | ICD-10-CM

## 2022-06-20 DIAGNOSIS — E78.01 FAMILIAL HYPERCHOLESTEROLEMIA: ICD-10-CM

## 2022-06-20 DIAGNOSIS — I73.9 PVD (PERIPHERAL VASCULAR DISEASE) WITH CLAUDICATION: ICD-10-CM

## 2022-06-20 PROBLEM — E78.2 MIXED HYPERLIPIDEMIA: Status: RESOLVED | Noted: 2022-05-12 | Resolved: 2022-06-20

## 2022-06-20 PROCEDURE — 99214 OFFICE O/P EST MOD 30 MIN: CPT | Performed by: INTERNAL MEDICINE

## 2022-06-20 NOTE — PROGRESS NOTES
[facility] [place]  [facility] CARDIOLOGY  [address] [place] [address]  Dept: [phone]  Dept Fax: [phone]    Betty Hume  1965    Follow Up Office Visit Note    History of Present Illness:  Betty Hume is a 57 y.o. female who presents to the clinic for Follow-up and Edema. Chest pain,- She is not longer having any chest pain, stress nuclear normal but has tobacco abuser and also chance hypercholesterolemia, will keep a close eye,     The following portions of the patient's history were reviewed and updated as appropriate: allergies, current medications, past family history, past medical history, past social history, past surgical history and problem list.    Medications:  aspirin  Evolocumab  linaclotide capsule  meloxicam  omeprazole  ramipril    Subjective  Allergies   Allergen Reactions   • Eryc [Erythromycin] Anaphylaxis and Swelling   • Shellfish-Derived Products Anaphylaxis   • Statins Other (See Comments)     Severe body aches    • Latex Rash        Past Medical History:   Diagnosis Date   • Abnormality of right breast on screening mammogram 11/12/2019   • Anemia    • ASCUS with positive high risk HPV cervical 7/29/2019   • Cervical dysplasia    • Diverticulitis    • Headache     SINUS HEADACHE / MIGRAINE   • HPV (human papilloma virus) infection    • Hypercholesterolemia    • IBS (irritable bowel syndrome)        Past Surgical History:   Procedure Laterality Date   • AUGMENTATION MAMMAPLASTY     • BREAST AUGMENTATION  2004    DOMINIQUE LATERAL   • KNEE ARTHROSCOPY Right 05/20/2019    turned into much more complicated   • KNEE SURGERY Right 2008   • TOTAL KNEE ARTHROPLASTY Right 05/2020   • TUBAL ABDOMINAL LIGATION  2003       Family History   Problem Relation Age of Onset   • Osteoporosis Mother    • Thyroid disease Mother    • Diabetes Mother    • Heart disease Mother    • Hypertension Mother    • Stroke Mother    • Asthma Mother    • Thyroid disease Father    •  "Diabetes Father    • Heart disease Father    • Hypertension Father    • Osteoporosis Sister    • Lupus Sister    • Thyroid disease Sister    • Heart disease Sister    • Hypertension Sister    • Colon cancer Maternal Grandmother         late 60's   • Hodgkin's lymphoma Sister         NON   • Skin cancer Brother    • Breast cancer Neg Hx    • Ovarian cancer Neg Hx    • Uterine cancer Neg Hx         Social History     Socioeconomic History   • Marital status:    • Number of children: 2   Tobacco Use   • Smoking status: Current Every Day Smoker     Packs/day: 0.50     Types: Cigarettes   • Smokeless tobacco: Never Used   Vaping Use   • Vaping Use: Never used   Substance and Sexual Activity   • Alcohol use: Yes     Comment: OCC   • Drug use: No   • Sexual activity: Yes     Partners: Male     Birth control/protection: Post-menopausal       Review of Systems   Constitutional: Negative.    HENT: Negative.    Respiratory: Negative.    Cardiovascular: Negative.    Endocrine: Negative.    Genitourinary: Negative.    Musculoskeletal: Negative.    Skin: Negative.    Allergic/Immunologic: Negative.    Neurological: Negative.    Hematological: Negative.    Psychiatric/Behavioral: Negative.        Cardiovascular Procedures    ECHO/MUGA:   STRESS TESTS:   CARDIAC CATH:   DEVICES:   HOLTER:   CT/MRI:   VASCULAR:   CARDIOTHORACIC:     Objective  Vitals:    06/20/22 1153   BP: 128/76   BP Location: Left arm   Patient Position: Sitting   Cuff Size: Adult   Pulse: 71   Resp: 18   Temp: 98 °F (36.7 °C)   TempSrc: Infrared   SpO2: 99%   Weight: 65.8 kg (145 lb)   Height: 154.9 cm (61\")   PainSc: 0-No pain     Body mass index is 27.4 kg/m².     Physical Exam  Constitutional:       Appearance: Healthy appearance. Not in distress.   Neck:      Vascular: No JVR. JVD normal.   Pulmonary:      Effort: Pulmonary effort is normal.      Breath sounds: Normal breath sounds. No wheezing. No rhonchi. No rales.   Chest:      Chest wall: Not " tender to palpatation.   Cardiovascular:      PMI at left midclavicular line. Normal rate. Regular rhythm. Normal S1. Normal S2.      Murmurs: There is no murmur.      No gallop. No click. No rub.   Pulses:     Intact distal pulses.   Edema:     Peripheral edema absent.   Abdominal:      General: Bowel sounds are normal.      Palpations: Abdomen is soft.      Tenderness: There is no abdominal tenderness.   Musculoskeletal: Normal range of motion.         General: No tenderness. Skin:     General: Skin is warm and dry.   Neurological:      General: No focal deficit present.      Mental Status: Alert and oriented to person, place and time.          Diagnostic Data  Procedures    Assessment and Plan  Diagnoses and all orders for this visit:    Benign essential HTN- The BP is fine 120.80 on ramipril 2.5 mg mg daily    Familial hypercholesterolemia- She ha filed due to muscle pain Crestor, livalo and Atorvastatin, will need shot, she has high risk for cardiac events , will use Repatha   -     Evolocumab (REPATHA) injection 140 mg    PVD (peripheral vascular disease) with claudication (HCC)- Mild disease   -     Evolocumab (REPATHA) injection 140 mg         Return in about 6 months (around 12/20/2022) for Recheck.    Bryn Haile MD  06/20/2022

## 2022-06-21 DIAGNOSIS — Z12.11 SCREENING FOR COLON CANCER: Primary | ICD-10-CM

## 2022-06-21 RX ORDER — SODIUM, POTASSIUM,MAG SULFATES 17.5-3.13G
1 SOLUTION, RECONSTITUTED, ORAL ORAL TAKE AS DIRECTED
Qty: 354 ML | Refills: 0 | Status: SHIPPED | OUTPATIENT
Start: 2022-06-21 | End: 2022-09-12

## 2022-06-30 ENCOUNTER — OFFICE VISIT (OUTPATIENT)
Dept: OBSTETRICS AND GYNECOLOGY | Age: 57
End: 2022-06-30

## 2022-06-30 VITALS
WEIGHT: 142.8 LBS | DIASTOLIC BLOOD PRESSURE: 70 MMHG | BODY MASS INDEX: 26.96 KG/M2 | SYSTOLIC BLOOD PRESSURE: 116 MMHG | HEIGHT: 61 IN

## 2022-06-30 DIAGNOSIS — Z12.31 SCREENING MAMMOGRAM FOR BREAST CANCER: ICD-10-CM

## 2022-06-30 DIAGNOSIS — Z01.419 WELL WOMAN EXAM WITH ROUTINE GYNECOLOGICAL EXAM: Primary | ICD-10-CM

## 2022-06-30 PROCEDURE — 99396 PREV VISIT EST AGE 40-64: CPT | Performed by: NURSE PRACTITIONER

## 2022-06-30 RX ORDER — DICLOFENAC SODIUM 75 MG/1
TABLET, DELAYED RELEASE ORAL EVERY 12 HOURS SCHEDULED
COMMUNITY

## 2022-06-30 NOTE — PROGRESS NOTES
[facility]  Routine Annual Visit    2022    Patient: Betty Ann Hume          MR#:[id number]      History of Present Illness    57 y.o. female  who presents for annual exam with no complaints     Postmenopausal- no bleeding. No HRT.  Due for mammogram- patient aware  Colonoscopy scheduled next month  Sees PCP regularly    No LMP recorded (lmp unknown). Patient is postmenopausal.  Obstetric History:  OB History        2    Para   2    Term   2       0    AB   0    Living   2       SAB   0    IAB   0    Ectopic   0    Molar   0    Multiple   0    Live Births   2               Menstrual History:     No LMP recorded (lmp unknown). Patient is postmenopausal.       Sexual History:       ________________________________________  Patient Active Problem List   Diagnosis   • ASCUS with positive high risk HPV cervical   • Mild dysplasia of cervix (SAVITA I)   • PVD (peripheral vascular disease) with claudication (HCC)   • Chest pain   • Tobacco use   • Annual physical exam   • Arthralgia   • Constipation   • Anxiety and depression   • Benign essential HTN   • Screening for colon cancer   • Screening for breast cancer   • Familial hypercholesterolemia   • PVD (peripheral vascular disease) with claudication (HCC)       Past Medical History:   Diagnosis Date   • Abnormality of right breast on screening mammogram 2019   • Anemia    • ASCUS with positive high risk HPV cervical 2019   • Cervical dysplasia    • Diverticulitis    • Headache     SINUS HEADACHE / MIGRAINE   • HPV (human papilloma virus) infection    • Hypercholesterolemia    • Hypertension    • IBS (irritable bowel syndrome)        Past Surgical History:   Procedure Laterality Date   • AUGMENTATION MAMMAPLASTY     • BREAST AUGMENTATION      DOMINIQUE LATERAL   • KNEE ARTHROSCOPY Right 2019    turned into much more complicated   • KNEE SURGERY Right    • TOTAL KNEE ARTHROPLASTY Right 2020   • TUBAL ABDOMINAL  LIGATION  2003       Social History     Tobacco Use   Smoking Status Current Every Day Smoker   • Packs/day: 0.50   • Types: Cigarettes   Smokeless Tobacco Never Used       Family History   Problem Relation Age of Onset   • Osteoporosis Mother    • Thyroid disease Mother    • Diabetes Mother    • Heart disease Mother    • Hypertension Mother    • Stroke Mother    • Asthma Mother    • Thyroid disease Father    • Diabetes Father    • Heart disease Father    • Hypertension Father    • Osteoporosis Sister    • Lupus Sister    • Thyroid disease Sister    • Heart disease Sister    • Hypertension Sister    • Colon cancer Maternal Grandmother         late 60's   • Hodgkin's lymphoma Sister         NON   • Skin cancer Brother    • Breast cancer Neg Hx    • Ovarian cancer Neg Hx    • Uterine cancer Neg Hx        Prior to Admission medications    Medication Sig Start Date End Date Taking? Authorizing Provider   aspirin (aspirin) 81 MG EC tablet Take 1 tablet by mouth Daily. 12/6/21  Yes Bryn Haile MD   diclofenac (VOLTAREN) 75 MG EC tablet Every 12 (Twelve) Hours.   Yes Provider, MD Nolberto   omeprazole (priLOSEC) 20 MG capsule Take 1 capsule by mouth Daily. 3/23/22  Yes Christine Bradford PA-C   ramipril (Altace) 2.5 MG capsule Take 1 capsule by mouth Daily. 12/6/21  Yes Bryn Haile MD   linaclotide (Linzess) 145 MCG capsule capsule Take 1 capsule by mouth Every Morning Before Breakfast. 5/12/22   Usama Zarate APRN   meloxicam (Mobic) 7.5 MG tablet Take 1 tablet by mouth Daily. 5/12/22   Usama Zarate APRN   sodium-potassium-magnesium sulfates (Suprep Bowel Prep Kit) 17.5-3.13-1.6 GM/177ML solution oral solution Take 1 bottle by mouth Take As Directed. Follow instructions that were mailed to your home. If you didn't receive these call [phone]. 6/21/22   Joseph Arthur MD     The following portions of the patient's history were reviewed and updated as appropriate:  "allergies, current medications, past family history, past medical history, past social history, past surgical history and problem list.    Review of Systems   Constitutional: Negative.    HENT: Negative.    Eyes: Negative for visual disturbance.   Respiratory: Negative for cough, shortness of breath and wheezing.    Cardiovascular: Negative for chest pain, palpitations and leg swelling.   Gastrointestinal: Negative for abdominal distention, abdominal pain, blood in stool, constipation, diarrhea, nausea and vomiting.   Endocrine: Negative for cold intolerance and heat intolerance.   Genitourinary: Negative for difficulty urinating, dyspareunia, dysuria, frequency, genital sores, hematuria, menstrual problem, pelvic pain, urgency, vaginal bleeding, vaginal discharge and vaginal pain.   Musculoskeletal: Negative.    Skin: Negative.    Neurological: Negative for dizziness, weakness, light-headedness, numbness and headaches.   Hematological: Negative.    Psychiatric/Behavioral: Negative.    Breasts: negative for lumps skin changes, dimpling, swelling, nipple changes/discharge bilaterally         Objective   Physical Exam    /70   Ht 154.9 cm (61\")   Wt 64.8 kg (142 lb 12.8 oz)   LMP  (LMP Unknown)   Breastfeeding No   BMI 26.98 kg/m²    BP Readings from Last 3 Encounters:   06/30/22 116/70   06/20/22 128/76   05/27/22 124/76      Wt Readings from Last 3 Encounters:   06/30/22 64.8 kg (142 lb 12.8 oz)   06/20/22 65.8 kg (145 lb)   05/27/22 65.3 kg (144 lb)        BMI: Estimated body mass index is 26.98 kg/m² as calculated from the following:    Height as of this encounter: 154.9 cm (61\").    Weight as of this encounter: 64.8 kg (142 lb 12.8 oz).            General:   alert, appears stated age and cooperative   Heart: regular rate and rhythm, S1, S2 normal, no murmur, click, rub or gallop   Lungs: clear to auscultation bilaterally   Abdomen: soft, non-tender, without masses or organomegaly   Breast: inspection " negative, no nipple discharge or bleeding, no masses or nodularity palpable   Vulva: External genitalia including bartholin's glands, Urethra, Burlington's gland and urethra meatus are normal, Perineum, rectum and anus appear normal  and Bladder appears normal without significant prolapse    Vagina: normal mucosa, curdlike discharge   Cervix: no cervical motion tenderness and no lesions   Uterus: normal size, mobile, non-tender and normal shape and consistency   Adnexa: normal adnexa     Assessment:    Diagnoses and all orders for this visit:    1. Well woman exam with routine gynecological exam (Primary)  -     IgP, Aptima HPV    2. Screening mammogram for breast cancer  -     Mammo Screening Digital Tomosynthesis Bilateral With CAD; Future        Healthy lifestyle modifications discussed, counseled on self breast exams and bone health    All of the patient's questions were addressed and answered, I have encouraged her to call for today's test results if she has not received them within 10 days.  Patient is advised to call with any change in her condition or with any other questions, otherwise return in 12 months for annual examination.      Terrie Graham, APRN  6/30/2022 14:49 EDT

## 2022-07-04 LAB
CYTOLOGIST CVX/VAG CYTO: NORMAL
CYTOLOGY CVX/VAG DOC CYTO: NORMAL
CYTOLOGY CVX/VAG DOC THIN PREP: NORMAL
DX ICD CODE: NORMAL
HIV 1 & 2 AB SER-IMP: NORMAL
HPV I/H RISK 4 DNA CVX QL PROBE+SIG AMP: NEGATIVE
OTHER STN SPEC: NORMAL
STAT OF ADQ CVX/VAG CYTO-IMP: NORMAL

## 2022-07-29 ENCOUNTER — LAB (OUTPATIENT)
Dept: CARDIOLOGY | Facility: CLINIC | Age: 57
End: 2022-07-29

## 2022-07-29 DIAGNOSIS — E78.01 FAMILIAL HYPERCHOLESTEROLEMIA: Primary | ICD-10-CM

## 2022-07-29 RX ORDER — RAMIPRIL 2.5 MG/1
2.5 CAPSULE ORAL DAILY
Qty: 30 CAPSULE | Refills: 5 | Status: SHIPPED | OUTPATIENT
Start: 2022-07-29 | End: 2022-12-19 | Stop reason: SDUPTHER

## 2022-07-30 LAB
ALBUMIN SERPL-MCNC: 4.5 G/DL (ref 3.8–4.9)
ALBUMIN/GLOB SERPL: 2 {RATIO} (ref 1.2–2.2)
ALP SERPL-CCNC: 77 IU/L (ref 44–121)
ALT SERPL-CCNC: 13 IU/L (ref 0–32)
AST SERPL-CCNC: 20 IU/L (ref 0–40)
BILIRUB SERPL-MCNC: 0.3 MG/DL (ref 0–1.2)
BUN SERPL-MCNC: 19 MG/DL (ref 6–24)
BUN/CREAT SERPL: 28 (ref 9–23)
CALCIUM SERPL-MCNC: 9.2 MG/DL (ref 8.7–10.2)
CHLORIDE SERPL-SCNC: 104 MMOL/L (ref 96–106)
CHOLEST SERPL-MCNC: 157 MG/DL (ref 100–199)
CO2 SERPL-SCNC: 22 MMOL/L (ref 20–29)
CREAT SERPL-MCNC: 0.67 MG/DL (ref 0.57–1)
EGFRCR SERPLBLD CKD-EPI 2021: 103 ML/MIN/1.73
GLOBULIN SER CALC-MCNC: 2.3 G/DL (ref 1.5–4.5)
GLUCOSE SERPL-MCNC: 128 MG/DL (ref 65–99)
HDLC SERPL-MCNC: 37 MG/DL
LDLC SERPL CALC-MCNC: 98 MG/DL (ref 0–99)
POTASSIUM SERPL-SCNC: 4.2 MMOL/L (ref 3.5–5.2)
PROT SERPL-MCNC: 6.8 G/DL (ref 6–8.5)
SODIUM SERPL-SCNC: 140 MMOL/L (ref 134–144)
TRIGL SERPL-MCNC: 120 MG/DL (ref 0–149)
VLDLC SERPL CALC-MCNC: 22 MG/DL (ref 5–40)

## 2022-08-01 ENCOUNTER — TELEPHONE (OUTPATIENT)
Dept: CARDIOLOGY | Facility: CLINIC | Age: 57
End: 2022-08-01

## 2022-08-01 NOTE — TELEPHONE ENCOUNTER
HUB TO SHARE:  Your lab work was good and your lipids are much better.  LDL has gone from 200 down to 98.  Still needs to be <70 but doing great.  Keep taking your medications.

## 2022-08-01 NOTE — TELEPHONE ENCOUNTER
----- Message from Bryn Haile MD sent at 7/30/2022  8:35 AM EDT -----  Lipids are much better,   from 200 to   LDL is 98, we need under 70, keep taking your meds

## 2022-08-10 ENCOUNTER — TELEPHONE (OUTPATIENT)
Dept: CARDIOLOGY | Facility: CLINIC | Age: 57
End: 2022-08-10

## 2022-08-10 NOTE — TELEPHONE ENCOUNTER
repatha was approved from 7-6-22 thru 8-5-2023 case number I [id number]. Left message for pt to get it from the hosptial. .

## 2022-08-22 ENCOUNTER — OUTSIDE FACILITY SERVICE (OUTPATIENT)
Dept: GASTROENTEROLOGY | Facility: CLINIC | Age: 57
End: 2022-08-22

## 2022-08-22 ENCOUNTER — TELEPHONE (OUTPATIENT)
Dept: FAMILY MEDICINE CLINIC | Facility: CLINIC | Age: 57
End: 2022-08-22

## 2022-08-22 ENCOUNTER — TELEPHONE (OUTPATIENT)
Dept: CARDIOLOGY | Facility: CLINIC | Age: 57
End: 2022-08-22

## 2022-08-22 PROCEDURE — 45380 COLONOSCOPY AND BIOPSY: CPT | Performed by: INTERNAL MEDICINE

## 2022-08-22 PROCEDURE — 45385 COLONOSCOPY W/LESION REMOVAL: CPT | Performed by: INTERNAL MEDICINE

## 2022-08-22 PROCEDURE — 88305 TISSUE EXAM BY PATHOLOGIST: CPT | Performed by: INTERNAL MEDICINE

## 2022-08-22 NOTE — TELEPHONE ENCOUNTER
Caller: ROSY @ [facility]    Relationship: NURSE    Best call back number:[phone]    What form or medical record are you requesting: FULL CARDIAC CLEARANCE,     Who is requesting this form or medical record from you: DR HILDA WILSON    How would you like to receive the form or medical records (pick-up, mail, fax): FAX    If fax, what is the fax number: [phone]    Timeframe paperwork needed: ASAP     Additional notes: PT IS HAVING PROCEDURE ON 9.27.22 FOR LEFT TOTAL KNEE REPLACEMENT AND NURSE CALLING TO GET FULL CARDIAC CLEARANCE - PT HAD 6MO APPT IN JUNE

## 2022-08-22 NOTE — TELEPHONE ENCOUNTER
Caller: PROVIDER    Relationship to patient:     Best call back number:  [phone]    Type of visit: PRE OP    Requested date: 09/12/22  3PM    Additional notes:  WITH CHEPE BO

## 2022-08-23 ENCOUNTER — LAB REQUISITION (OUTPATIENT)
Dept: LAB | Facility: HOSPITAL | Age: 57
End: 2022-08-23

## 2022-08-23 DIAGNOSIS — K57.30 DIVERTICULOSIS OF LARGE INTESTINE WITHOUT PERFORATION OR ABSCESS WITHOUT BLEEDING: ICD-10-CM

## 2022-08-23 DIAGNOSIS — K59.00 CONSTIPATION, UNSPECIFIED: ICD-10-CM

## 2022-08-23 DIAGNOSIS — D12.8 BENIGN NEOPLASM OF RECTUM: ICD-10-CM

## 2022-08-23 DIAGNOSIS — D12.2 BENIGN NEOPLASM OF ASCENDING COLON: ICD-10-CM

## 2022-08-23 DIAGNOSIS — K64.8 OTHER HEMORRHOIDS: ICD-10-CM

## 2022-08-23 DIAGNOSIS — Z83.71 FAMILY HISTORY OF COLONIC POLYPS: ICD-10-CM

## 2022-08-23 DIAGNOSIS — Z86.010 PERSONAL HISTORY OF COLONIC POLYPS: ICD-10-CM

## 2022-08-24 ENCOUNTER — TELEPHONE (OUTPATIENT)
Dept: GASTROENTEROLOGY | Facility: CLINIC | Age: 57
End: 2022-08-24

## 2022-08-24 LAB
CYTO UR: NORMAL
LAB AP CASE REPORT: NORMAL
LAB AP CLINICAL INFORMATION: NORMAL
PATH REPORT.FINAL DX SPEC: NORMAL
PATH REPORT.GROSS SPEC: NORMAL

## 2022-08-24 NOTE — TELEPHONE ENCOUNTER
I tried to call Ms Hume to give pathology results. No answer; left voice message. I will send result letter to patient's mychart and the mail.

## 2022-08-24 NOTE — TELEPHONE ENCOUNTER
----- Message from Joseph Arthur MD sent at 8/24/2022  1:22 PM EDT -----  Let Ms. Hume know there was 1 adenoma type polyp.  She should have a repeat examination in 5 years

## 2022-09-12 ENCOUNTER — OFFICE VISIT (OUTPATIENT)
Dept: FAMILY MEDICINE CLINIC | Facility: CLINIC | Age: 57
End: 2022-09-12

## 2022-09-12 VITALS
HEART RATE: 78 BPM | BODY MASS INDEX: 26.54 KG/M2 | HEIGHT: 62 IN | OXYGEN SATURATION: 98 % | WEIGHT: 144.2 LBS | DIASTOLIC BLOOD PRESSURE: 86 MMHG | SYSTOLIC BLOOD PRESSURE: 124 MMHG

## 2022-09-12 DIAGNOSIS — R21 RASH: ICD-10-CM

## 2022-09-12 DIAGNOSIS — G89.29 CHRONIC PAIN OF LEFT KNEE: ICD-10-CM

## 2022-09-12 DIAGNOSIS — Z01.818 PRE-OPERATIVE CLEARANCE: Primary | ICD-10-CM

## 2022-09-12 DIAGNOSIS — M25.562 CHRONIC PAIN OF LEFT KNEE: ICD-10-CM

## 2022-09-12 PROCEDURE — 93000 ELECTROCARDIOGRAM COMPLETE: CPT | Performed by: NURSE PRACTITIONER

## 2022-09-12 PROCEDURE — 36415 COLL VENOUS BLD VENIPUNCTURE: CPT | Performed by: NURSE PRACTITIONER

## 2022-09-12 PROCEDURE — 99214 OFFICE O/P EST MOD 30 MIN: CPT | Performed by: NURSE PRACTITIONER

## 2022-09-12 RX ORDER — CLOTRIMAZOLE AND BETAMETHASONE DIPROPIONATE 10; .64 MG/G; MG/G
1 CREAM TOPICAL 2 TIMES DAILY PRN
Qty: 45 G | Refills: 0 | Status: SHIPPED | OUTPATIENT
Start: 2022-09-12 | End: 2022-12-06 | Stop reason: SDUPTHER

## 2022-09-12 NOTE — ASSESSMENT & PLAN NOTE
CBC and CMP drawn today poor preadmission testing recommendations.  Chest x-ray completed we will let know if radiologist sees anything.  EKG completed.  She will be cleared from a PCP standpoint that she needs cardiac clearance from her cardiologist.  She is aware of this and states she will contact her cardiologist when she leaves here to get cardiac clearance obtained.  Return to clinic or ED with any issues or concerns.

## 2022-09-12 NOTE — PROGRESS NOTES
"Chief Complaint  Pre-op Exam    Subjective          Betty Ann Hume presents to [facility]  History of Present Illness     Zen for preop exam.  Doing well with no issues or concerns today.  Chronic left knee pain and will be having a total knee replacement scheduled with Dr. Rose on 9/27/2022.  Brings in preadmission testing order form and they are needing CBC CMP chest x-ray and EKG.  She will need cardiac clearance from her cardiologist  whom she sees for PVD, hypertension, and hyperlipidemia.  She states she has done fine with anesthesia in the past with no issues or concerns.  No dizziness no headache no chest pain no chest pressure no shortness of breath no trouble breathing no urinary or bowel issues.    In the past she has seen ENT for a rash that recurs on her outer ears.  States it comes and goes.  States they gave her combination clotrimazole plus betamethasone 1% / 0.05% and states it works great when she needs it.  She is asking if I can send a refill of this.    Objective   Vital Signs:   /86   Pulse 78   Ht 157.5 cm (62\")   Wt 65.4 kg (144 lb 3.2 oz)   SpO2 98%   BMI 26.37 kg/m²     Body mass index is 26.37 kg/m².    Review of Systems   Constitutional: Negative for chills, fatigue and fever.   HENT: Negative for congestion and sinus pressure.    Eyes: Negative for visual disturbance.   Respiratory: Negative for cough, shortness of breath and wheezing.    Cardiovascular: Negative.    Gastrointestinal: Negative for abdominal pain, diarrhea, nausea and vomiting.   Genitourinary: Negative for decreased urine volume, dysuria, frequency, hematuria and urgency.   Musculoskeletal: Positive for arthralgias.   Neurological: Negative for dizziness and headache.       Past History:  Medical History: has a past medical history of Abnormality of right breast on screening mammogram (11/12/2019), Anemia, ASCUS with positive high risk HPV cervical (07/29/2019), " Cervical dysplasia, Diverticulitis, Headache, HPV (human papilloma virus) infection, Hypercholesterolemia, Hypertension, and IBS (irritable bowel syndrome).   Surgical History: has a past surgical history that includes Knee surgery (Right, 2008); Tubal ligation (2003); Breast Augmentation (2004); Knee arthroscopy (Right, 05/20/2019); Augmentation mammaplasty; and Total knee arthroplasty (Right, 05/2020).   Family History: family history includes Asthma in her mother; Colon cancer in her maternal grandmother; Diabetes in her father and mother; Heart disease in her father, mother, and sister; Hodgkin's lymphoma in her sister; Hypertension in her father, mother, and sister; Lupus in her sister; Osteoporosis in her mother and sister; Skin cancer in her brother; Stroke in her mother; Thyroid disease in her father, mother, and sister.   Social History: reports that she has been smoking cigarettes. She has a 12.50 pack-year smoking history. She has never used smokeless tobacco. She reports current alcohol use. She reports that she does not use drugs.    PHQ-2 Depression Screening  Little interest or pleasure in doing things? 0-->not at all   Feeling down, depressed, or hopeless? 0-->not at all   PHQ-2 Total Score 0        PHQ-9 Depression Screening  Little interest or pleasure in doing things? 0-->not at all   Feeling down, depressed, or hopeless? 0-->not at all   Trouble falling or staying asleep, or sleeping too much?     Feeling tired or having little energy?     Poor appetite or overeating?     Feeling bad about yourself - or that you are a failure or have let yourself or your family down?     Trouble concentrating on things, such as reading the newspaper or watching television?     Moving or speaking so slowly that other people could have noticed? Or the opposite - being so fidgety or restless that you have been moving around a lot more than usual?     Thoughts that you would be better off dead, or of hurting yourself  in some way?     PHQ-9 Total Score 0   If you checked off any problems, how difficult have these problems made it for you to do your work, take care of things at home, or get along with other people?       PHQ-9 Total Score: 0      Patient screened positive for depression based on a PHQ-9 score of 0 on 9/12/2022. Follow-up recommendations include:          Current Outpatient Medications:   •  aspirin (aspirin) 81 MG EC tablet, Take 1 tablet by mouth Daily., Disp: 30 tablet, Rfl: 0  •  diclofenac (VOLTAREN) 75 MG EC tablet, Every 12 (Twelve) Hours., Disp: , Rfl:   •  Evolocumab (REPATHA) solution auto-injector SureClick injection, Inject 1 mL under the skin into the appropriate area as directed Every 14 (Fourteen) Days., Disp: 1 mL, Rfl: 1  •  omeprazole (priLOSEC) 20 MG capsule, Take 1 capsule by mouth Daily., Disp: 90 capsule, Rfl: 0  •  ramipril (Altace) 2.5 MG capsule, Take 1 capsule by mouth Daily., Disp: 30 capsule, Rfl: 5  •  clotrimazole-betamethasone (Lotrisone) 1-0.05 % cream, Apply 1 application topically to the appropriate area as directed 2 (Two) Times a Day As Needed (rash)., Disp: 45 g, Rfl: 0   (Not in a hospital admission)     Allergies: Eryc [erythromycin], Shellfish-derived products, Statins, and Latex    Physical Exam  Constitutional:       Appearance: Normal appearance.   HENT:      Right Ear: Tympanic membrane, ear canal and external ear normal.      Left Ear: Tympanic membrane, ear canal and external ear normal.      Nose: Nose normal.      Mouth/Throat:      Mouth: Mucous membranes are moist.      Pharynx: Oropharynx is clear.   Eyes:      Extraocular Movements: Extraocular movements intact.      Conjunctiva/sclera: Conjunctivae normal.      Pupils: Pupils are equal, round, and reactive to light.   Cardiovascular:      Rate and Rhythm: Normal rate and regular rhythm.      Heart sounds: Normal heart sounds.   Pulmonary:      Effort: Pulmonary effort is normal.      Breath sounds: Normal breath  sounds.   Abdominal:      General: Abdomen is flat. Bowel sounds are normal.      Palpations: Abdomen is soft.   Neurological:      General: No focal deficit present.      Mental Status: She is alert and oriented to person, place, and time. Mental status is at baseline.   Psychiatric:         Mood and Affect: Mood normal.         Behavior: Behavior normal.         Thought Content: Thought content normal.         Judgment: Judgment normal.          Result Review :                ECG 12 Lead    Date/Time: 9/12/2022 3:25 PM  Performed by: Usama Zarate APRN  Authorized by: Usama Zarate APRN   Comparison: not compared with previous ECG   Comments: Rate 68  Sinus rhythm              Assessment and Plan    Diagnoses and all orders for this visit:    1. Pre-operative clearance (Primary)  Assessment & Plan:  CBC and CMP drawn today poor preadmission testing recommendations.  Chest x-ray completed we will let know if radiologist sees anything.  EKG completed.  She will be cleared from a PCP standpoint that she needs cardiac clearance from her cardiologist.  She is aware of this and states she will contact her cardiologist when she leaves here to get cardiac clearance obtained.  Return to clinic or ED with any issues or concerns.    Orders:  -     CBC & Differential; Future  -     Comprehensive Metabolic Panel; Future  -     XR Chest PA & Lateral (In Office)    2. Chronic pain of left knee  Assessment & Plan:  Follow-up with orthopedic surgery as scheduled.      3. Rash  Assessment & Plan:  Will refill the topical ointment to use as needed.  Risk discussed and understood.  Education provided.  Return to clinic or ED with any issues or concerns.    Orders:  -     clotrimazole-betamethasone (Lotrisone) 1-0.05 % cream; Apply 1 application topically to the appropriate area as directed 2 (Two) Times a Day As Needed (rash).  Dispense: 45 g; Refill: 0    Other orders  -     ECG 12 Lead            BMI is >= 25 and <30.  (Overweight) The following options were offered after discussion;: exercise counseling/recommendations and nutrition counseling/recommendations       Follow Up   Return if symptoms worsen or fail to improve, for With PCP .  Patient was given instructions and counseling regarding her condition or for health maintenance advice. Please see specific information pulled into the AVS if appropriate.     LARY Villatoro

## 2022-09-12 NOTE — ASSESSMENT & PLAN NOTE
Will refill the topical ointment to use as needed.  Risk discussed and understood.  Education provided.  Return to clinic or ED with any issues or concerns.

## 2022-09-13 ENCOUNTER — TELEPHONE (OUTPATIENT)
Dept: FAMILY MEDICINE CLINIC | Facility: CLINIC | Age: 57
End: 2022-09-13

## 2022-09-13 LAB
ALBUMIN SERPL-MCNC: 4.9 G/DL (ref 3.8–4.9)
ALBUMIN/GLOB SERPL: 2.9 {RATIO} (ref 1.2–2.2)
ALP SERPL-CCNC: 73 IU/L (ref 44–121)
ALT SERPL-CCNC: 16 IU/L (ref 0–32)
AST SERPL-CCNC: 17 IU/L (ref 0–40)
BASOPHILS # BLD AUTO: 0.1 X10E3/UL (ref 0–0.2)
BASOPHILS NFR BLD AUTO: 1 %
BILIRUB SERPL-MCNC: <0.2 MG/DL (ref 0–1.2)
BUN SERPL-MCNC: 12 MG/DL (ref 6–24)
BUN/CREAT SERPL: 21 (ref 9–23)
CALCIUM SERPL-MCNC: 9.5 MG/DL (ref 8.7–10.2)
CHLORIDE SERPL-SCNC: 103 MMOL/L (ref 96–106)
CO2 SERPL-SCNC: 21 MMOL/L (ref 20–29)
CREAT SERPL-MCNC: 0.57 MG/DL (ref 0.57–1)
EGFRCR-CYS SERPLBLD CKD-EPI 2021: 107 ML/MIN/1.73
EOSINOPHIL # BLD AUTO: 0.2 X10E3/UL (ref 0–0.4)
EOSINOPHIL NFR BLD AUTO: 1 %
ERYTHROCYTE [DISTWIDTH] IN BLOOD BY AUTOMATED COUNT: 13.4 % (ref 11.7–15.4)
GLOBULIN SER CALC-MCNC: 1.7 G/DL (ref 1.5–4.5)
GLUCOSE SERPL-MCNC: 95 MG/DL (ref 65–99)
HCT VFR BLD AUTO: 41.9 % (ref 34–46.6)
HGB BLD-MCNC: 13.7 G/DL (ref 11.1–15.9)
IMM GRANULOCYTES # BLD AUTO: 0 X10E3/UL (ref 0–0.1)
IMM GRANULOCYTES NFR BLD AUTO: 0 %
LYMPHOCYTES # BLD AUTO: 2.9 X10E3/UL (ref 0.7–3.1)
LYMPHOCYTES NFR BLD AUTO: 27 %
MCH RBC QN AUTO: 30.9 PG (ref 26.6–33)
MCHC RBC AUTO-ENTMCNC: 32.7 G/DL (ref 31.5–35.7)
MCV RBC AUTO: 94 FL (ref 79–97)
MONOCYTES # BLD AUTO: 0.9 X10E3/UL (ref 0.1–0.9)
MONOCYTES NFR BLD AUTO: 8 %
NEUTROPHILS # BLD AUTO: 6.7 X10E3/UL (ref 1.4–7)
NEUTROPHILS NFR BLD AUTO: 63 %
PLATELET # BLD AUTO: 328 X10E3/UL (ref 150–450)
POTASSIUM SERPL-SCNC: 3.9 MMOL/L (ref 3.5–5.2)
PROT SERPL-MCNC: 6.6 G/DL (ref 6–8.5)
RBC # BLD AUTO: 4.44 X10E6/UL (ref 3.77–5.28)
SODIUM SERPL-SCNC: 141 MMOL/L (ref 134–144)
WBC # BLD AUTO: 10.8 X10E3/UL (ref 3.4–10.8)

## 2022-09-13 NOTE — TELEPHONE ENCOUNTER
Please let patient know labs are stable and her x-ray was fine.  I gave all the papers to Mora and she will fax them over to surgeon's office.  She just needs to get cardiac clearance from her cardiologist now.  Thank you

## 2022-11-04 ENCOUNTER — APPOINTMENT (OUTPATIENT)
Dept: WOMENS IMAGING | Facility: HOSPITAL | Age: 57
End: 2022-11-04

## 2022-11-04 PROCEDURE — 77067 SCR MAMMO BI INCL CAD: CPT | Performed by: RADIOLOGY

## 2022-12-06 ENCOUNTER — OFFICE VISIT (OUTPATIENT)
Dept: FAMILY MEDICINE CLINIC | Facility: CLINIC | Age: 57
End: 2022-12-06

## 2022-12-06 VITALS
SYSTOLIC BLOOD PRESSURE: 124 MMHG | HEART RATE: 74 BPM | DIASTOLIC BLOOD PRESSURE: 86 MMHG | OXYGEN SATURATION: 98 % | BODY MASS INDEX: 25.87 KG/M2 | WEIGHT: 141.44 LBS

## 2022-12-06 DIAGNOSIS — F41.9 ANXIETY AND DEPRESSION: Primary | ICD-10-CM

## 2022-12-06 DIAGNOSIS — F32.A ANXIETY AND DEPRESSION: Primary | ICD-10-CM

## 2022-12-06 DIAGNOSIS — Z96.652 HISTORY OF LEFT KNEE REPLACEMENT: ICD-10-CM

## 2022-12-06 DIAGNOSIS — K21.9 GASTROESOPHAGEAL REFLUX DISEASE, UNSPECIFIED WHETHER ESOPHAGITIS PRESENT: ICD-10-CM

## 2022-12-06 DIAGNOSIS — R21 RASH: ICD-10-CM

## 2022-12-06 PROCEDURE — 99214 OFFICE O/P EST MOD 30 MIN: CPT | Performed by: NURSE PRACTITIONER

## 2022-12-06 RX ORDER — DULOXETIN HYDROCHLORIDE 20 MG/1
20 CAPSULE, DELAYED RELEASE ORAL 2 TIMES DAILY
COMMUNITY
End: 2022-12-06 | Stop reason: SDUPTHER

## 2022-12-06 RX ORDER — DULOXETIN HYDROCHLORIDE 20 MG/1
20 CAPSULE, DELAYED RELEASE ORAL 2 TIMES DAILY
Qty: 180 CAPSULE | Refills: 1 | Status: SHIPPED | OUTPATIENT
Start: 2022-12-06

## 2022-12-06 RX ORDER — CLOTRIMAZOLE AND BETAMETHASONE DIPROPIONATE 10; .64 MG/G; MG/G
1 CREAM TOPICAL 2 TIMES DAILY PRN
Qty: 45 G | Refills: 0 | Status: SHIPPED | OUTPATIENT
Start: 2022-12-06

## 2022-12-06 RX ORDER — OMEPRAZOLE 20 MG/1
20 CAPSULE, DELAYED RELEASE ORAL DAILY
Qty: 90 CAPSULE | Refills: 1 | Status: SHIPPED | OUTPATIENT
Start: 2022-12-06

## 2022-12-06 NOTE — ASSESSMENT & PLAN NOTE
No rash currently.  We will refill clotrimazole betamethasone cream for her to have on hand to use as needed.  Risk discussed and understood.  Education provided.  Return to clinic or ED with any issues or concerns.

## 2022-12-06 NOTE — ASSESSMENT & PLAN NOTE
Cymbalta refilled.  PHQ-9 completed and discussed.  No thoughts of suicide hurting herself or anyone else.  Risk of meds discussed and understood.  Education provided.  Return to clinic or ED with any issues or concerns.

## 2022-12-06 NOTE — ASSESSMENT & PLAN NOTE
She is healed up well.  Follow-up with orthopedics on the 22nd of this month as scheduled to determine her return to work status.

## 2022-12-06 NOTE — PROGRESS NOTES
Chief Complaint  Med Refill    Subjective          Betty Ann Hume presents to [facility]  History of Present Illness     Patient presents needing refills of her omeprazole and Cymbalta.  States her anxiety depression is well controlled on Cymbalta and her heartburn is well controlled on omeprazole.  She also states at times she gets a rash around her ear and uses clotrimazole betamethasone cream as needed is asking for refill just to have on hand, no rash currently.  Overall states she is doing well.      She is status post left knee total replacement and follows up with her orthopedist on the 22nd of this month and is hoping to be released back to work.  She states she has healed up wonderfully.  She states she walks can bend her knee can squat down does her house chores does everything she feels she has been to be ready to go back to work.    Objective   Vital Signs:   /86   Pulse 74   Wt 64.2 kg (141 lb 7 oz)   SpO2 98%   BMI 25.87 kg/m²     Body mass index is 25.87 kg/m².    Review of Systems   Constitutional: Negative for chills, fatigue and fever.   Respiratory: Negative for cough, shortness of breath and wheezing.    Cardiovascular: Negative for chest pain and palpitations.   Gastrointestinal: Negative for abdominal pain, diarrhea, nausea, vomiting and indigestion.   Genitourinary: Negative for dysuria and frequency.   Musculoskeletal: Negative for arthralgias and joint swelling.   Neurological: Negative for headache.   Psychiatric/Behavioral: Negative.        Past History:  Medical History: has a past medical history of Abnormality of right breast on screening mammogram (11/12/2019), Anemia, ASCUS with positive high risk HPV cervical (07/29/2019), Cervical dysplasia, Diverticulitis, Headache, HPV (human papilloma virus) infection, Hypercholesterolemia, Hypertension, and IBS (irritable bowel syndrome).   Surgical History: has a past surgical history that includes Knee  surgery (Right, 2008); Tubal ligation (2003); Breast Augmentation (2004); Knee arthroscopy (Right, 05/20/2019); Augmentation mammaplasty; and Total knee arthroplasty (Right, 05/2020).   Family History: family history includes Asthma in her mother; Colon cancer in her maternal grandmother; Diabetes in her father and mother; Heart disease in her father, mother, and sister; Hodgkin's lymphoma in her sister; Hypertension in her father, mother, and sister; Lupus in her sister; Osteoporosis in her mother and sister; Skin cancer in her brother; Stroke in her mother; Thyroid disease in her father, mother, and sister.   Social History: reports that she has been smoking cigarettes. She has a 12.50 pack-year smoking history. She has never used smokeless tobacco. She reports current alcohol use. She reports that she does not use drugs.    PHQ-2 Depression Screening  Little interest or pleasure in doing things? 0-->not at all   Feeling down, depressed, or hopeless? 0-->not at all   PHQ-2 Total Score 0        PHQ-9 Depression Screening  Little interest or pleasure in doing things? 0-->not at all   Feeling down, depressed, or hopeless? 0-->not at all   Trouble falling or staying asleep, or sleeping too much?     Feeling tired or having little energy?     Poor appetite or overeating?     Feeling bad about yourself - or that you are a failure or have let yourself or your family down?     Trouble concentrating on things, such as reading the newspaper or watching television?     Moving or speaking so slowly that other people could have noticed? Or the opposite - being so fidgety or restless that you have been moving around a lot more than usual?     Thoughts that you would be better off dead, or of hurting yourself in some way?     PHQ-9 Total Score 0   If you checked off any problems, how difficult have these problems made it for you to do your work, take care of things at home, or get along with other people?       PHQ-9 Total  Score: 0      Patient screened positive for depression based on a PHQ-9 score of 0 on 12/6/2022. Follow-up recommendations include: PCP managing depression and Prescribed antidepressant medication treatment.         Current Outpatient Medications:   •  aspirin (aspirin) 81 MG EC tablet, Take 1 tablet by mouth Daily., Disp: 30 tablet, Rfl: 0  •  clotrimazole-betamethasone (Lotrisone) 1-0.05 % cream, Apply 1 application topically to the appropriate area as directed 2 (Two) Times a Day As Needed (rash)., Disp: 45 g, Rfl: 0  •  diclofenac (VOLTAREN) 75 MG EC tablet, Every 12 (Twelve) Hours., Disp: , Rfl:   •  DULoxetine (CYMBALTA) 20 MG capsule, Take 1 capsule by mouth 2 (Two) Times a Day., Disp: 180 capsule, Rfl: 1  •  Evolocumab (REPATHA) solution auto-injector SureClick injection, Inject 1 mL under the skin into the appropriate area as directed Every 14 (Fourteen) Days., Disp: 1 mL, Rfl: 1  •  omeprazole (priLOSEC) 20 MG capsule, Take 1 capsule by mouth Daily., Disp: 90 capsule, Rfl: 1  •  ramipril (Altace) 2.5 MG capsule, Take 1 capsule by mouth Daily., Disp: 30 capsule, Rfl: 5   (Not in a hospital admission)     Allergies: Eryc [erythromycin], Shellfish-derived products, Statins, and Latex    Physical Exam  Constitutional:       Appearance: Normal appearance.   Eyes:      Conjunctiva/sclera: Conjunctivae normal.      Pupils: Pupils are equal, round, and reactive to light.   Cardiovascular:      Rate and Rhythm: Normal rate and regular rhythm.      Heart sounds: Normal heart sounds.   Pulmonary:      Effort: Pulmonary effort is normal.      Breath sounds: Normal breath sounds.   Neurological:      General: No focal deficit present.      Mental Status: She is alert and oriented to person, place, and time. Mental status is at baseline.   Psychiatric:         Attention and Perception: Attention and perception normal.         Mood and Affect: Mood and affect normal.         Speech: Speech normal.         Behavior:  Behavior normal. Behavior is cooperative.         Thought Content: Thought content normal. Thought content does not include homicidal or suicidal ideation. Thought content does not include homicidal or suicidal plan.         Cognition and Memory: Cognition and memory normal.         Judgment: Judgment normal.          Result Review :                   Assessment and Plan    Diagnoses and all orders for this visit:    1. Anxiety and depression (Primary)  Assessment & Plan:  Cymbalta refilled.  PHQ-9 completed and discussed.  No thoughts of suicide hurting herself or anyone else.  Risk of meds discussed and understood.  Education provided.  Return to clinic or ED with any issues or concerns.    Orders:  -     DULoxetine (CYMBALTA) 20 MG capsule; Take 1 capsule by mouth 2 (Two) Times a Day.  Dispense: 180 capsule; Refill: 1    2. Gastroesophageal reflux disease, unspecified whether esophagitis present  Assessment & Plan:  Med refilled.  Proper diet and exercise plan discussed and encouraged.  Risk discussed understood.  Return to clinic or ED with any issues or concerns.    Orders:  -     omeprazole (priLOSEC) 20 MG capsule; Take 1 capsule by mouth Daily.  Dispense: 90 capsule; Refill: 1    3. History of left knee replacement  Assessment & Plan:  She is healed up well.  Follow-up with orthopedics on the 22nd of this month as scheduled to determine her return to work status.      4. Rash  Assessment & Plan:  No rash currently.  We will refill clotrimazole betamethasone cream for her to have on hand to use as needed.  Risk discussed and understood.  Education provided.  Return to clinic or ED with any issues or concerns.    Orders:  -     clotrimazole-betamethasone (Lotrisone) 1-0.05 % cream; Apply 1 application topically to the appropriate area as directed 2 (Two) Times a Day As Needed (rash).  Dispense: 45 g; Refill: 0            BMI is >= 25 and <30. (Overweight) The following options were offered after discussion;:  exercise counseling/recommendations and nutrition counseling/recommendations       Follow Up   Return in about 6 months (around 6/6/2023).  Patient was given instructions and counseling regarding her condition or for health maintenance advice. Please see specific information pulled into the AVS if appropriate.     ALRY Villatoro

## 2022-12-06 NOTE — ASSESSMENT & PLAN NOTE
Med refilled.  Proper diet and exercise plan discussed and encouraged.  Risk discussed understood.  Return to clinic or ED with any issues or concerns.

## 2022-12-19 ENCOUNTER — OFFICE VISIT (OUTPATIENT)
Dept: CARDIOLOGY | Facility: CLINIC | Age: 57
End: 2022-12-19

## 2022-12-19 VITALS
RESPIRATION RATE: 16 BRPM | HEART RATE: 94 BPM | HEIGHT: 62 IN | WEIGHT: 142 LBS | OXYGEN SATURATION: 97 % | TEMPERATURE: 97.1 F | DIASTOLIC BLOOD PRESSURE: 76 MMHG | SYSTOLIC BLOOD PRESSURE: 110 MMHG | BODY MASS INDEX: 26.13 KG/M2

## 2022-12-19 DIAGNOSIS — Z72.0 TOBACCO USE: ICD-10-CM

## 2022-12-19 DIAGNOSIS — E78.01 FAMILIAL HYPERCHOLESTEROLEMIA: Primary | ICD-10-CM

## 2022-12-19 DIAGNOSIS — I10 BENIGN ESSENTIAL HTN: ICD-10-CM

## 2022-12-19 PROCEDURE — 99213 OFFICE O/P EST LOW 20 MIN: CPT | Performed by: INTERNAL MEDICINE

## 2022-12-19 RX ORDER — RAMIPRIL 2.5 MG/1
2.5 CAPSULE ORAL DAILY
Qty: 90 CAPSULE | Refills: 3 | Status: SHIPPED | OUTPATIENT
Start: 2022-12-19

## 2022-12-19 NOTE — PROGRESS NOTES
[facility] [place]  [facility]  [address] [place] [address]  Dept: [phone]  Dept Fax: [phone]    Betty Ann Hume  1965    Follow Up Office Visit Note    History of Present Illness:  Betty Ann Hume is a 57 y.o. female who presents to the clinic for Follow-up. Chest pain- No longer has chest pain, stress test was normal, she is still smoking,     The following portions of the patient's history were reviewed and updated as appropriate: allergies, current medications, past family history, past medical history, past social history, past surgical history and problem list.    Medications:  aspirin  clotrimazole-betamethasone  diclofenac  DULoxetine  Evolocumab solution auto-injector  omeprazole  ramipril    Subjective  Allergies   Allergen Reactions   • Eryc [Erythromycin] Anaphylaxis and Swelling   • Shellfish-Derived Products Anaphylaxis   • Statins Other (See Comments)     Severe body aches    • Latex Rash        Past Medical History:   Diagnosis Date   • Abnormality of right breast on screening mammogram 11/12/2019   • Anemia    • ASCUS with positive high risk HPV cervical 07/29/2019   • Cervical dysplasia    • Diverticulitis    • Headache     SINUS HEADACHE / MIGRAINE   • HPV (human papilloma virus) infection    • Hypercholesterolemia    • Hypertension    • IBS (irritable bowel syndrome)        Past Surgical History:   Procedure Laterality Date   • AUGMENTATION MAMMAPLASTY     • BREAST AUGMENTATION  2004    DOMINIQUE LATERAL   • KNEE ARTHROSCOPY Right 05/20/2019    turned into much more complicated   • KNEE SURGERY Right 2008   • KNEE SURGERY Left    • TOTAL KNEE ARTHROPLASTY Right 05/2020   • TUBAL ABDOMINAL LIGATION  2003       Family History   Problem Relation Age of Onset   • Osteoporosis Mother    • Thyroid disease Mother    • Diabetes Mother    • Heart disease Mother    • Hypertension Mother    • Stroke Mother    • Asthma Mother    • Thyroid disease Father    •  "Diabetes Father    • Heart disease Father    • Hypertension Father    • Osteoporosis Sister    • Lupus Sister    • Thyroid disease Sister    • Heart disease Sister    • Hypertension Sister    • Colon cancer Maternal Grandmother         late 60's   • Hodgkin's lymphoma Sister         NON   • Skin cancer Brother    • Breast cancer Neg Hx    • Ovarian cancer Neg Hx    • Uterine cancer Neg Hx         Social History     Socioeconomic History   • Marital status:    • Number of children: 2   Tobacco Use   • Smoking status: Every Day     Packs/day: 0.50     Years: 25.00     Pack years: 12.50     Types: Cigarettes   • Smokeless tobacco: Never   Vaping Use   • Vaping Use: Never used   Substance and Sexual Activity   • Alcohol use: Yes     Comment: OCC   • Drug use: No   • Sexual activity: Yes     Partners: Male     Birth control/protection: Post-menopausal     Comment: 1x per month       Review of Systems   Constitutional: Negative.    HENT: Negative.    Respiratory: Negative.    Cardiovascular: Negative.    Endocrine: Negative.    Genitourinary: Negative.    Musculoskeletal: Negative.    Skin: Negative.    Allergic/Immunologic: Negative.    Neurological: Negative.    Hematological: Negative.    Psychiatric/Behavioral: Negative.        Cardiovascular Procedures    ECHO/MUGA:   STRESS TESTS:   CARDIAC CATH:   DEVICES:   HOLTER:   CT/MRI:   VASCULAR:   CARDIOTHORACIC:     Objective  Vitals:    12/19/22 1412   BP: 110/76   BP Location: Left arm   Patient Position: Sitting   Cuff Size: Adult   Pulse: 94   Resp: 16   Temp: 97.1 °F (36.2 °C)   TempSrc: Temporal   SpO2: 97%   Weight: 64.4 kg (142 lb)   Height: 157.5 cm (62\")   PainSc: 0-No pain     Body mass index is 25.97 kg/m².     Physical Exam  Constitutional:       Appearance: Healthy appearance. Not in distress.   Neck:      Vascular: No JVR. JVD normal.   Pulmonary:      Effort: Pulmonary effort is normal.      Breath sounds: Normal breath sounds. No wheezing. No " rhonchi. No rales.   Chest:      Chest wall: Not tender to palpatation.   Cardiovascular:      PMI at left midclavicular line. Normal rate. Regular rhythm. Normal S1. Normal S2.      Murmurs: There is no murmur.      No gallop. No click. No rub.   Pulses:     Intact distal pulses.   Edema:     Peripheral edema absent.   Abdominal:      General: Bowel sounds are normal.      Palpations: Abdomen is soft.      Tenderness: There is no abdominal tenderness.   Musculoskeletal: Normal range of motion.         General: No tenderness. Skin:     General: Skin is warm and dry.   Neurological:      General: No focal deficit present.      Mental Status: Alert and oriented to person, place and time.          Diagnostic Data  Procedures    Assessment and Plan  Diagnoses and all orders for this visit:    Familial hypercholesterolemia- She has run out Repatha, will sent Rx, will need a Lipids in 3 months, she did not tolerates any statins    Benign essential HTN- BP is great 110.70, on Ramipril 2,5     Tobacco use- Still smoking     Other orders  -     Evolocumab (REPATHA) solution auto-injector SureClick injection; Inject 1 mL under the skin into the appropriate area as directed Every 14 (Fourteen) Days.  -     ramipril (Altace) 2.5 MG capsule; Take 1 capsule by mouth Daily.         Return in about 6 months (around 6/19/2023) for Recheck.    Bryn Haile MD  12/19/2022

## 2022-12-29 ENCOUNTER — OFFICE VISIT (OUTPATIENT)
Dept: FAMILY MEDICINE CLINIC | Facility: CLINIC | Age: 57
End: 2022-12-29

## 2022-12-29 VITALS
DIASTOLIC BLOOD PRESSURE: 76 MMHG | BODY MASS INDEX: 25.97 KG/M2 | HEART RATE: 78 BPM | SYSTOLIC BLOOD PRESSURE: 124 MMHG | TEMPERATURE: 98.5 F | OXYGEN SATURATION: 97 % | WEIGHT: 141.13 LBS | HEIGHT: 62 IN

## 2022-12-29 DIAGNOSIS — J40 BRONCHITIS: Primary | ICD-10-CM

## 2022-12-29 DIAGNOSIS — K59.00 CONSTIPATION, UNSPECIFIED CONSTIPATION TYPE: ICD-10-CM

## 2022-12-29 PROCEDURE — 99213 OFFICE O/P EST LOW 20 MIN: CPT | Performed by: PHYSICIAN ASSISTANT

## 2022-12-29 RX ORDER — PREDNISONE 20 MG/1
TABLET ORAL
Qty: 9 TABLET | Refills: 0 | Status: SHIPPED | OUTPATIENT
Start: 2022-12-29

## 2022-12-29 RX ORDER — DEXTROMETHORPHAN HYDROBROMIDE AND PROMETHAZINE HYDROCHLORIDE 15; 6.25 MG/5ML; MG/5ML
5 SYRUP ORAL 4 TIMES DAILY PRN
Qty: 118 ML | Refills: 0 | Status: SHIPPED | OUTPATIENT
Start: 2022-12-29

## 2022-12-29 RX ORDER — AMOXICILLIN AND CLAVULANATE POTASSIUM 875; 125 MG/1; MG/1
1 TABLET, FILM COATED ORAL 2 TIMES DAILY
Qty: 14 TABLET | Refills: 0 | Status: SHIPPED | OUTPATIENT
Start: 2022-12-29 | End: 2023-01-05

## 2022-12-29 RX ORDER — ALBUTEROL SULFATE 90 UG/1
2 AEROSOL, METERED RESPIRATORY (INHALATION) EVERY 4 HOURS PRN
Qty: 18 G | Refills: 1 | Status: SHIPPED | OUTPATIENT
Start: 2022-12-29

## 2022-12-29 RX ORDER — EVOLOCUMAB 140 MG/ML
INJECTION, SOLUTION SUBCUTANEOUS
Qty: 2 ML | Refills: 0 | Status: SHIPPED | OUTPATIENT
Start: 2022-12-29 | End: 2023-01-23

## 2022-12-29 NOTE — PROGRESS NOTES
"Chief Complaint  Cough (2 weeks/) and Diarrhea (2 weeks/)    Subjective        Betty Ann Hume presents to [facility]  History of Present Illness  But notes that its been getting worse for the past few days.  She states that it is a nonproductive cough and that the past few nights she has had to sit up to try to get some sleep.  She states that she usually gets bronchitis in the winter.  She states that she continues to smoke daily.  She denies any fever or runny nose.  She states she has had some nasal congestion.  She states that she has had to use albuterol in the past but has no inhalers currently.    Patient states for the past week she has been having diarrhea several times a day.  She states that she has had nausea at times but no vomiting.  She denies any bloody diarrhea or vomiting.  She states that she can eat but does not have a diarrhea stool right after.  She states that she has had some postnasal drainage.  She has not taken any Imodium.  She states that she usually has issues with constipation.    coughin for two weeks but cough getting worse past fwe days. She states that it is non productive. Last few nights has had to sit up to try to sleep. She has bronchitis in the winter ofter. She continues to smoke.  She denies fever or runny nose. She had some nasal congestion. She has been on albuiteorl in hte past ahs has none left.     Objective   Vital Signs:  /76   Pulse 78   Temp 98.5 °F (36.9 °C)   Ht 157.5 cm (62\")   Wt 64 kg (141 lb 2 oz)   SpO2 97%   BMI 25.81 kg/m²   Estimated body mass index is 25.81 kg/m² as calculated from the following:    Height as of this encounter: 157.5 cm (62\").    Weight as of this encounter: 64 kg (141 lb 2 oz).          Physical Exam  Vitals and nursing note reviewed.   Constitutional:       Appearance: Normal appearance. She is normal weight.   HENT:      Head: Normocephalic and atraumatic.      Right Ear: Tympanic membrane, " ear canal and external ear normal.      Left Ear: Tympanic membrane, ear canal and external ear normal.      Nose: Congestion present.      Mouth/Throat:      Mouth: Mucous membranes are moist.   Eyes:      Pupils: Pupils are equal, round, and reactive to light.   Cardiovascular:      Rate and Rhythm: Normal rate and regular rhythm.      Heart sounds: Normal heart sounds.   Pulmonary:      Effort: Pulmonary effort is normal.      Comments: Coarse breath sounds  Abdominal:      Palpations: Abdomen is soft.      Comments: Palpable stool throughout colon   Neurological:      General: No focal deficit present.      Mental Status: She is alert.   Psychiatric:         Mood and Affect: Mood normal.        Result Review :                Assessment and Plan   Diagnoses and all orders for this visit:    1. Bronchitis (Primary)  -     amoxicillin-clavulanate (Augmentin) 875-125 MG per tablet; Take 1 tablet by mouth 2 (Two) Times a Day for 7 days.  Dispense: 14 tablet; Refill: 0  -     predniSONE (DELTASONE) 20 MG tablet; 2 tab po qd x 3 days then 1 tab po qd x 3 days  Dispense: 9 tablet; Refill: 0  -     albuterol sulfate  (90 Base) MCG/ACT inhaler; Inhale 2 puffs Every 4 (Four) Hours As Needed for Wheezing.  Dispense: 18 g; Refill: 1  -     promethazine-dextromethorphan (PROMETHAZINE-DM) 6.25-15 MG/5ML syrup; Take 5 mL by mouth 4 (Four) Times a Day As Needed for Cough.  Dispense: 118 mL; Refill: 0  We will add prednisone and Augmentin for her bronchitis.  We will also give her albuterol to help with cough.  As well as promethazine which will also decrease her cough.  If she finds that her cough is still improving in 24 hours she is to give our office a call.  2. Constipation, unspecified constipation type    Discussed with the patient that one of the signs of chronic constipation is to have a bit of liquid stool multiple times every day.  Discussed that her stool was palpable throughout her colon.  We will have her  restart her stool softener or MiraLAX daily.         Follow Up   No follow-ups on file.  Patient was given instructions and counseling regarding her condition or for health maintenance advice. Please see specific information pulled into the AVS if appropriate.

## 2023-01-09 NOTE — TELEPHONE ENCOUNTER
Labor Analgesia    Date/Time: 1/8/2023 11:55 PM  Performed by: Juan Carlos Castillo MD  Authorized by: Juan Carlos Castillo Patient needs to have her CombiPatch filled through [facility].  Can you approve please?  (seen 4/13/18 by Terrie, however, will run out before she returns from vacation.)

## 2023-01-23 RX ORDER — EVOLOCUMAB 140 MG/ML
INJECTION, SOLUTION SUBCUTANEOUS
Qty: 2 ML | Refills: 0 | Status: SHIPPED | OUTPATIENT
Start: 2023-01-23

## 2023-07-28 DIAGNOSIS — F41.9 ANXIETY AND DEPRESSION: ICD-10-CM

## 2023-07-28 DIAGNOSIS — F32.A ANXIETY AND DEPRESSION: ICD-10-CM

## 2023-07-28 RX ORDER — DULOXETIN HYDROCHLORIDE 20 MG/1
CAPSULE, DELAYED RELEASE ORAL
Qty: 60 CAPSULE | Refills: 0 | Status: SHIPPED | OUTPATIENT
Start: 2023-07-28

## 2023-07-28 NOTE — TELEPHONE ENCOUNTER
Rx Refill Note  Requested Prescriptions     Pending Prescriptions Disp Refills    DULoxetine (CYMBALTA) 20 MG capsule [Pharmacy Med Name: DULOXETINE HCL DR CAPS 20MG] 180 capsule 3     Sig: TAKE 1 CAPSULE TWICE A DAY      Last office visit with prescribing clinician: 12/6/2022   Last telemedicine visit with prescribing clinician: Visit date not found   Next office visit with prescribing clinician: Visit date not found                         Would you like a call back once the refill request has been completed: [] Yes [] No    If the office needs to give you a call back, can they leave a voicemail: [] Yes [] No    Nesha Moore MA  07/28/23, 10:30 EDT

## 2023-08-21 ENCOUNTER — OFFICE VISIT (OUTPATIENT)
Dept: FAMILY MEDICINE CLINIC | Facility: CLINIC | Age: 58
End: 2023-08-21
Payer: COMMERCIAL

## 2023-08-21 VITALS
SYSTOLIC BLOOD PRESSURE: 124 MMHG | DIASTOLIC BLOOD PRESSURE: 78 MMHG | HEIGHT: 62 IN | BODY MASS INDEX: 27.68 KG/M2 | WEIGHT: 150.44 LBS | HEART RATE: 84 BPM | OXYGEN SATURATION: 97 %

## 2023-08-21 DIAGNOSIS — M54.42 ACUTE LEFT-SIDED LOW BACK PAIN WITH LEFT-SIDED SCIATICA: Primary | ICD-10-CM

## 2023-08-21 DIAGNOSIS — Z72.0 TOBACCO USE: ICD-10-CM

## 2023-08-21 PROCEDURE — 99213 OFFICE O/P EST LOW 20 MIN: CPT | Performed by: NURSE PRACTITIONER

## 2023-08-21 RX ORDER — PREDNISONE 20 MG/1
TABLET ORAL
Qty: 18 TABLET | Refills: 0 | Status: SHIPPED | OUTPATIENT
Start: 2023-08-21 | End: 2023-08-30

## 2023-08-21 RX ORDER — CYCLOBENZAPRINE HCL 10 MG
10 TABLET ORAL 3 TIMES DAILY PRN
Qty: 20 TABLET | Refills: 0 | Status: SHIPPED | OUTPATIENT
Start: 2023-08-21

## 2023-08-23 ENCOUNTER — TELEPHONE (OUTPATIENT)
Dept: FAMILY MEDICINE CLINIC | Facility: CLINIC | Age: 58
End: 2023-08-23
Payer: COMMERCIAL

## 2023-08-23 DIAGNOSIS — M54.50 LOW BACK PAIN, UNSPECIFIED BACK PAIN LATERALITY, UNSPECIFIED CHRONICITY, UNSPECIFIED WHETHER SCIATICA PRESENT: Primary | ICD-10-CM

## 2023-08-23 DIAGNOSIS — M54.42 ACUTE LEFT-SIDED LOW BACK PAIN WITH LEFT-SIDED SCIATICA: Primary | ICD-10-CM

## 2023-08-23 NOTE — PROGRESS NOTES
"Chief Complaint  Back Pain    Subjective          Betty Ann Hume presents to [facility]  History of Present Illness    Patient presents with concerns of low back pain.  States low back pain in the left lower back that radiates into the left leg.  States the pain when it occurs in the leg he is down the lateral side of the leg.  States 1 month ago she was bending over to  a 24 pack of water and this is when it started.  No urinary or bowel issues.  States she has a history of having both knees replaced in the past so she is wondering if just with being out of alignment with that has led to an issue with her back now.  It does get stiff and feels like it spasms at times.  Standing long periods worsens it.    Objective   Vital Signs:   /78   Pulse 84   Ht 157.5 cm (62\")   Wt 68.2 kg (150 lb 7 oz)   SpO2 97%   BMI 27.52 kg/mý     Body mass index is 27.52 kg/mý.    Review of Systems   Constitutional:  Negative for chills and fever.   Respiratory:  Negative for cough and shortness of breath.    Cardiovascular:  Negative for chest pain and leg swelling.   Gastrointestinal:  Negative for abdominal pain, constipation, diarrhea, nausea and vomiting.   Genitourinary:  Negative for decreased urine volume, dysuria, frequency, hematuria and urgency.   Musculoskeletal:  Positive for back pain.   Neurological:  Negative for weakness, numbness and headache.     Past History:  Medical History: has a past medical history of Abnormality of right breast on screening mammogram (11/12/2019), Anemia, ASCUS with positive high risk HPV cervical (07/29/2019), Cervical dysplasia, Diverticulitis, Headache, HPV (human papilloma virus) infection, Hypercholesterolemia, Hypertension, and IBS (irritable bowel syndrome).   Surgical History: has a past surgical history that includes Knee surgery (Right, 2008); Tubal ligation (2003); Breast Augmentation (2004); Knee arthroscopy (Right, 05/20/2019); " Augmentation mammaplasty; Total knee arthroplasty (Right, 05/2020); and Knee surgery (Left).   Family History: family history includes Asthma in her mother; Colon cancer in her maternal grandmother; Diabetes in her father and mother; Heart disease in her father, mother, and sister; Hodgkin's lymphoma in her sister; Hypertension in her father, mother, and sister; Lupus in her sister; Osteoporosis in her mother and sister; Skin cancer in her brother; Stroke in her mother; Thyroid disease in her father, mother, and sister.   Social History: reports that she has been smoking cigarettes. She has a 12.50 pack-year smoking history. She has never used smokeless tobacco. She reports current alcohol use. She reports that she does not use drugs.    PHQ-2 Depression Screening  Little interest or pleasure in doing things? 0-->not at all   Feeling down, depressed, or hopeless? 0-->not at all   PHQ-2 Total Score 0        PHQ-9 Depression Screening  Little interest or pleasure in doing things? 0-->not at all   Feeling down, depressed, or hopeless? 0-->not at all   Trouble falling or staying asleep, or sleeping too much?     Feeling tired or having little energy?     Poor appetite or overeating?     Feeling bad about yourself - or that you are a failure or have let yourself or your family down?     Trouble concentrating on things, such as reading the newspaper or watching television?     Moving or speaking so slowly that other people could have noticed? Or the opposite - being so fidgety or restless that you have been moving around a lot more than usual?     Thoughts that you would be better off dead, or of hurting yourself in some way?     PHQ-9 Total Score 0   If you checked off any problems, how difficult have these problems made it for you to do your work, take care of things at home, or get along with other people?       PHQ-9 Total Score: 0      Patient screened positive for depression based on a PHQ-9 score of 0 on 8/21/2023.  Follow-up recommendations include:          Current Outpatient Medications:     aspirin (aspirin) 81 MG EC tablet, Take 1 tablet by mouth Daily., Disp: 30 tablet, Rfl: 0    DULoxetine (CYMBALTA) 20 MG capsule, TAKE 1 CAPSULE TWICE A DAY (Patient taking differently: 1 capsule. Taking 1 one time  a day), Disp: 60 capsule, Rfl: 0    ramipril (Altace) 2.5 MG capsule, Take 1 capsule by mouth Daily., Disp: 90 capsule, Rfl: 3    cyclobenzaprine (FLEXERIL) 10 MG tablet, Take 1 tablet by mouth 3 (Three) Times a Day As Needed for Muscle Spasms., Disp: 20 tablet, Rfl: 0    predniSONE (DELTASONE) 20 MG tablet, Take 3 tablets by mouth Daily for 3 days, THEN 2 tablets Daily for 3 days, THEN 1 tablet Daily for 3 days., Disp: 18 tablet, Rfl: 0   (Not in a hospital admission)     Allergies: Erythromycin, Shellfish-derived products, Statins, and Latex    Physical Exam  Constitutional:       Appearance: Normal appearance.   Cardiovascular:      Rate and Rhythm: Normal rate and regular rhythm.      Heart sounds: Normal heart sounds.   Pulmonary:      Effort: Pulmonary effort is normal.      Breath sounds: Normal breath sounds.   Musculoskeletal:      Lumbar back: Spasms and tenderness present. No swelling, edema, deformity, signs of trauma, lacerations or bony tenderness. Decreased range of motion. Negative right straight leg raise test and negative left straight leg raise test. No scoliosis.   Neurological:      General: No focal deficit present.      Mental Status: She is alert and oriented to person, place, and time. Mental status is at baseline.   Psychiatric:         Mood and Affect: Mood normal.         Behavior: Behavior normal.         Thought Content: Thought content normal.         Judgment: Judgment normal.        Result Review :                   Assessment and Plan    Diagnoses and all orders for this visit:    1. Acute left-sided low back pain with left-sided sciatica (Primary)  Assessment & Plan:  X-ray completed.  Will  let know if radiologist sees anything.  Rest and ice in 15-minute intervals encouraged.  Limit picking up heavy objects and proper body mechanics discussed and encouraged.  We will try a course of prednisone.  Avoid NSAIDs while on prednisone.  Cyclobenzaprine as needed for spasms.  Risk of meds discussed and understood.  Education provided.  Return in 1 week.,  Sooner if worsens.  Return to clinic or ED with any issues or concerns.    Orders:  -     predniSONE (DELTASONE) 20 MG tablet; Take 3 tablets by mouth Daily for 3 days, THEN 2 tablets Daily for 3 days, THEN 1 tablet Daily for 3 days.  Dispense: 18 tablet; Refill: 0  -     cyclobenzaprine (FLEXERIL) 10 MG tablet; Take 1 tablet by mouth 3 (Three) Times a Day As Needed for Muscle Spasms.  Dispense: 20 tablet; Refill: 0  -     XR Spine Lumbar 2 or 3 View (In Office)    2. Tobacco use              BMI is >= 25 and <30. (Overweight) The following options were offered after discussion;: exercise counseling/recommendations and nutrition counseling/recommendations       Follow Up   Return if symptoms worsen or fail to improve.  Patient was given instructions and counseling regarding her condition or for health maintenance advice. Please see specific information pulled into the AVS if appropriate.     LARY Villatoro

## 2023-08-23 NOTE — PATIENT INSTRUCTIONS
Steps to Quit Smoking  Smoking tobacco is the leading cause of preventable death. It can affect almost every organ in the body. Smoking puts you and those around you at risk for developing many serious chronic diseases.  Quitting smoking can be very challenging. Do not get discouraged if you are not successful the first time. Some people need to make many attempts to quit before they achieve long-term success. Do your best to stick to your quit plan, and talk with your health care provider if you have any questions or concerns.  How do I get ready to quit?  When you decide to quit smoking, create a plan to help you succeed. Before you quit:  Pick a date to quit. Set a date within the next 2 weeks to give you time to prepare.  Write down the reasons why you are quitting. Keep this list in places where you will see it often.  Tell your family, friends, and co-workers that you are quitting. Support from people you are close to can make quitting easier.  Talk with your health care provider about your options for quitting smoking.  Find out what treatment options are covered by your health insurance.  Identify people, places, things, and activities that make you want to smoke (triggers). Avoid them.  What first steps can I take to quit smoking?  Throw away all cigarettes at home, at work, and in your car.  Throw away smoking accessories, such as ashtrays and lighters.  Clean your car. Make sure to empty the ashtray.  Clean your home, including curtains and carpets.  What strategies can I use to quit smoking?  Talk with your health care provider about combining strategies, such as taking medicines while you are also receiving in-person counseling. Using these two strategies together makes you more likely to succeed in quitting than if you used either strategy on its own.  If you are pregnant or breastfeeding, talk with your health care provider about finding counseling or other support strategies to quit smoking. Do not  take medicine to help you quit smoking unless your health care provider tells you to.  Quit right away  Quit smoking completely, instead of gradually reducing how much you smoke over a period of time. Stopping smoking right away may be more successful than gradually quitting.  Attend in-person counseling to help you build problem-solving skills. You are more likely to succeed in quitting if you attend counseling sessions regularly. Even short sessions of 10 minutes can be effective.  Take medicine  You may take medicines to help you quit smoking. Some medicines require a prescription. You can also purchase over-the-counter medicines. Medicines may have nicotine in them to replace the nicotine in cigarettes. Medicines may:  Help to stop cravings.  Help to relieve withdrawal symptoms.  Your health care provider may recommend:  Nicotine patches, gum, or lozenges.  Nicotine inhalers or sprays.  Non-nicotine medicine that you take by mouth.  Find resources  Find resources and support systems that can help you quit smoking and remain smoke-free after you quit. These resources are most helpful when you use them often. They include:  Online chats with a counselor.  Telephone quitlines.  Printed self-help materials.  Support groups or group counseling.  Text messaging programs.  Mobile phone apps or applications. Use apps that can help you stick to your quit plan by providing reminders, tips, and encouragement. Examples of free services include Quit Guide from the CDC and smokefree.gov    What can I do to make it easier to quit?    Reach out to your family and friends for support and encouragement. Call telephone quitlines, such as 1800-QUIT-NOW, reach out to support groups, or work with a counselor for support.  Ask people who smoke to avoid smoking around you.  Avoid places that trigger you to smoke, such as bars, parties, or smoke-break areas at work.  Spend time with people who do not smoke.  Lessen the stress in your  life. Stress can be a smoking trigger for some people. To lessen stress, try:  Exercising regularly.  Doing deep-breathing exercises.  Doing yoga.  Meditating.  What benefits will I see if I quit smoking?  Over time, you should start to see positive results, such as:  Improved sense of smell and taste.  Decreased coughing and sore throat.  Slower heart rate.  Lower blood pressure.  Clearer and healthier skin.  The ability to breathe more easily.  Fewer sick days.  Summary  Quitting smoking can be very challenging. Do not get discouraged if you are not successful the first time. Some people need to make many attempts to quit before they achieve long-term success.  When you decide to quit smoking, create a plan to help you succeed.  Quit smoking right away, not slowly over a period of time.  Find resources and support systems that can help you quit smoking and remain smoke-free after you quit.  This information is not intended to replace advice given to you by your health care provider. Make sure you discuss any questions you have with your health care provider.  Document Revised: 12/09/2022 Document Reviewed: 12/09/2022  [employer] Patient Education c 2023 [employer]

## 2023-08-23 NOTE — TELEPHONE ENCOUNTER
I see the [facility] paperwork the patient dropped off.  And her return to work form.  Has she missed work and been off of work (if so what dates)?  Does she feel she is ready to go back to work?  If her back is not improving then she also needs to let me know so that we can possibly send her to a specialist.  Thanks

## 2023-08-23 NOTE — ASSESSMENT & PLAN NOTE
X-ray completed.  Will let know if radiologist sees anything.  Rest and ice in 15-minute intervals encouraged.  Limit picking up heavy objects and proper body mechanics discussed and encouraged.  We will try a course of prednisone.  Avoid NSAIDs while on prednisone.  Cyclobenzaprine as needed for spasms.  Risk of meds discussed and understood.  Education provided.  Return in 1 week.,  Sooner if worsens.  Return to clinic or ED with any issues or concerns.

## 2023-08-23 NOTE — TELEPHONE ENCOUNTER
Caller: Hume, Betty Ann    Relationship: Self    Best call back number: [phone]     Caller requesting test results: XRAY     What test was performed: XRAY     When was the test performed: 8.21    Where was the test performed: OFFICE     Additional notes: WANTS RESULTS OF XRAY, WANTS TO KNOW WHAT TO DO NEXT AND ALSO DROPPING OFF [place] PAPERWORK DUE TO THIS AND PAIN

## 2023-08-25 NOTE — TELEPHONE ENCOUNTER
Ortho referral placed.  Please let her know that I have finished filling out her return to work and FMLA papers.  I have placed them on Adia's desk.  Please get them scanned into her chart and then she can pick them up.  Thanks

## 2023-08-25 NOTE — TELEPHONE ENCOUNTER
I will fill out the FMLA but does she feel she can safely go back to work yet (since I have to fill out the return to work form also). Can do effectively and safely do her job? Also, I see she is scheduled to see her orthopedist Dr. Rose on 8/31/23. Does she plan on talking about her back then with him or does she need a new referral to someone else?

## 2023-08-25 NOTE — TELEPHONE ENCOUNTER
Spoke with pt. Pt states she has missed this week /August 21-August 25. She states she is still in a lot of pain but is returning to work on Monday. Pt states she is interested in seeing a specialist to help with her pain.

## 2023-08-29 NOTE — TELEPHONE ENCOUNTER
HUB TO READ  [facility] paperwork is now in chart and copy is at  for pt to   LVM

## 2023-09-18 ENCOUNTER — TELEPHONE (OUTPATIENT)
Dept: CARDIOLOGY | Facility: CLINIC | Age: 58
End: 2023-09-18
Payer: COMMERCIAL

## 2023-09-18 NOTE — TELEPHONE ENCOUNTER
Caller: Hume, Betty Ann    Relationship to patient: Self    Best call back number: [phone]    Chief complaint: MEDICATION CHECK UP AND ROUTINE FOLLOW FOR BP    Type of visit: FU    Requested date: WEDNESDAY THE 20TH     Additional notes: PLEASE LEAVE MESSAGE ON PTS PHONE AND SHE WILL CALL BACK.

## 2023-09-18 NOTE — TELEPHONE ENCOUNTER
Left a message that if Ms. Hume would like to be seen on Wednesday, Sept 20th, we have a 3:15 appt available.  Please call back and let us know if you would like that slot.  Thank you.

## 2023-09-20 ENCOUNTER — OFFICE VISIT (OUTPATIENT)
Dept: CARDIOLOGY | Facility: CLINIC | Age: 58
End: 2023-09-20
Payer: COMMERCIAL

## 2023-09-20 VITALS
RESPIRATION RATE: 18 BRPM | BODY MASS INDEX: 27.42 KG/M2 | HEART RATE: 78 BPM | SYSTOLIC BLOOD PRESSURE: 128 MMHG | OXYGEN SATURATION: 97 % | DIASTOLIC BLOOD PRESSURE: 78 MMHG | WEIGHT: 149 LBS | HEIGHT: 62 IN

## 2023-09-20 DIAGNOSIS — I10 BENIGN ESSENTIAL HTN: Primary | ICD-10-CM

## 2023-09-20 DIAGNOSIS — R07.2 PRECORDIAL PAIN: ICD-10-CM

## 2023-09-20 DIAGNOSIS — I73.9 PVD (PERIPHERAL VASCULAR DISEASE) WITH CLAUDICATION: ICD-10-CM

## 2023-09-20 DIAGNOSIS — E78.01 FAMILIAL HYPERCHOLESTEROLEMIA: ICD-10-CM

## 2023-09-20 PROBLEM — Z01.818 PRE-OPERATIVE CLEARANCE: Status: RESOLVED | Noted: 2022-09-12 | Resolved: 2023-09-20

## 2023-09-20 PROCEDURE — 93000 ELECTROCARDIOGRAM COMPLETE: CPT | Performed by: INTERNAL MEDICINE

## 2023-09-20 PROCEDURE — 99214 OFFICE O/P EST MOD 30 MIN: CPT | Performed by: INTERNAL MEDICINE

## 2023-09-20 RX ORDER — RAMIPRIL 2.5 MG/1
2.5 CAPSULE ORAL DAILY
Qty: 90 CAPSULE | Refills: 2 | Status: SHIPPED | OUTPATIENT
Start: 2023-09-20 | End: 2023-09-20 | Stop reason: SDUPTHER

## 2023-09-20 RX ORDER — RAMIPRIL 2.5 MG/1
2.5 CAPSULE ORAL DAILY
Qty: 14 CAPSULE | Refills: 0 | Status: SHIPPED | OUTPATIENT
Start: 2023-09-20 | End: 2023-09-20 | Stop reason: SDUPTHER

## 2023-09-20 RX ORDER — RAMIPRIL 2.5 MG/1
2.5 CAPSULE ORAL DAILY
Qty: 90 CAPSULE | Refills: 3 | Status: SHIPPED | OUTPATIENT
Start: 2023-09-20

## 2023-09-20 NOTE — PROGRESS NOTES
[facility]  [facility]  [address] [place] [address]  Dept: [phone]  Dept Fax: [phone]    Betty Ann Hume  1965    Follow Up Office Visit Note    History of Present Illness:  Betty Ann Hume is a 58 y.o. female who presents to the clinic for Follow-up.Hypertension-  BP is 140.80, has run out Ramipril , will restart , denies any complaints also she is not longer taking Repatha, due to economical issues, but her  might get insurance in 2 weeks     The following portions of the patient's history were reviewed and updated as appropriate: allergies, current medications, past family history, past medical history, past social history, past surgical history, and problem list.    Medications:  aspirin  cyclobenzaprine  DULoxetine  ramipril    Subjective  Allergies   Allergen Reactions   • Erythromycin Anaphylaxis, Swelling and Unknown (See Comments)   • Shellfish-Derived Products Anaphylaxis   • Statins Other (See Comments)     Severe body aches    • Latex Rash        Past Medical History:   Diagnosis Date   • Abnormality of right breast on screening mammogram 11/12/2019   • Anemia    • ASCUS with positive high risk HPV cervical 07/29/2019   • Cervical dysplasia    • Diverticulitis    • Headache     SINUS HEADACHE / MIGRAINE   • HPV (human papilloma virus) infection    • Hypercholesterolemia    • Hypertension    • IBS (irritable bowel syndrome)        Past Surgical History:   Procedure Laterality Date   • AUGMENTATION MAMMAPLASTY     • BREAST AUGMENTATION  2004    DOMINIQUE LATERAL   • KNEE ARTHROSCOPY Right 05/20/2019    turned into much more complicated   • KNEE SURGERY Right 2008   • KNEE SURGERY Left    • TOTAL KNEE ARTHROPLASTY Right 05/2020   • TUBAL ABDOMINAL LIGATION  2003       Family History   Problem Relation Age of Onset   • Osteoporosis Mother    • Thyroid disease Mother    • Diabetes Mother    • Heart disease Mother    • Hypertension Mother    •  "Stroke Mother    • Asthma Mother    • Thyroid disease Father    • Diabetes Father    • Heart disease Father    • Hypertension Father    • Osteoporosis Sister    • Lupus Sister    • Thyroid disease Sister    • Heart disease Sister    • Hypertension Sister    • Colon cancer Maternal Grandmother         late 60's   • Hodgkin's lymphoma Sister         NON   • Skin cancer Brother    • Breast cancer Neg Hx    • Ovarian cancer Neg Hx    • Uterine cancer Neg Hx         Social History     Socioeconomic History   • Marital status:    • Number of children: 2   Tobacco Use   • Smoking status: Every Day     Packs/day: 0.50     Years: 25.00     Pack years: 12.50     Types: Cigarettes   • Smokeless tobacco: Never   Vaping Use   • Vaping Use: Never used   Substance and Sexual Activity   • Alcohol use: Yes     Comment: OCC   • Drug use: No   • Sexual activity: Yes     Partners: Male     Birth control/protection: Post-menopausal     Comment: 1x per month       Review of Systems   Constitutional: Negative.    HENT: Negative.     Respiratory: Negative.     Cardiovascular: Negative.    Endocrine: Negative.    Genitourinary: Negative.    Musculoskeletal: Negative.    Skin: Negative.    Allergic/Immunologic: Negative.    Neurological: Negative.    Hematological: Negative.    Psychiatric/Behavioral: Negative.       Cardiovascular Procedures    ECHO/MUGA:  STRESS TESTS:   CARDIAC CATH:   DEVICES:   HOLTER:   CT/MRI:   VASCULAR:   CARDIOTHORACIC:     Objective  Vitals:    09/20/23 1501   BP: 128/78   BP Location: Right arm   Patient Position: Lying   Cuff Size: Adult   Pulse: 78   Resp: 18   SpO2: 97%   Weight: 67.6 kg (149 lb)   Height: 157.5 cm (62\")   PainSc: 0-No pain     Body mass index is 27.25 kg/m².     Physical Exam  Vitals reviewed.   Constitutional:       Appearance: Healthy appearance. Not in distress.   Neck:      Vascular: No JVR. JVD normal.   Pulmonary:      Effort: Pulmonary effort is normal.      Breath sounds: " Normal breath sounds. No wheezing. No rhonchi. No rales.   Chest:      Chest wall: Not tender to palpatation.   Cardiovascular:      PMI at left midclavicular line. Normal rate. Regular rhythm. Normal S1. Normal S2.       Murmurs: There is no murmur.      No gallop.  No click. No rub.   Pulses:     Intact distal pulses.   Edema:     Peripheral edema absent.   Abdominal:      General: Bowel sounds are normal.      Palpations: Abdomen is soft.      Tenderness: There is no abdominal tenderness.   Musculoskeletal: Normal range of motion.         General: No tenderness. Skin:     General: Skin is warm and dry.   Neurological:      General: No focal deficit present.      Mental Status: Alert and oriented to person, place and time.        Diagnostic Data    ECG 12 Lead    Date/Time: 9/20/2023 3:27 PM  Performed by: Bryn Haile MD  Authorized by: Bryn Haile MD   Comparison: compared with previous ECG from 9/12/2022  Similar to previous ECG  Rhythm: sinus rhythm  Rate: normal  BPM: 71  QRS axis: normal    Clinical impression: normal ECG        Assessment and Plan  Diagnoses and all orders for this visit:    Benign essential HTN- BP is 140.80, will restart Ramipril 2,5 mg daily    Precordial pain- No longer has chest pain, stress nuclear normal  she is still smoking,    Familial hypercholesterolemia- She has quit the Repatha due to financial issues.she did have problems with statin LDL was 200    PVD (peripheral vascular disease) with claudication- mild to moderate disease no claudication    Other orders  -     ramipril (Altace) 2.5 MG capsule; Take 1 capsule by mouth Daily.         Return in about 6 months (around 3/20/2024) for Recheck with Dr. Haile.    Bryn Haile MD  09/20/2023

## 2023-09-26 ENCOUNTER — OFFICE VISIT (OUTPATIENT)
Dept: FAMILY MEDICINE CLINIC | Facility: CLINIC | Age: 58
End: 2023-09-26
Payer: COMMERCIAL

## 2023-09-26 VITALS
WEIGHT: 150.6 LBS | DIASTOLIC BLOOD PRESSURE: 80 MMHG | HEART RATE: 82 BPM | TEMPERATURE: 98.4 F | HEIGHT: 62 IN | OXYGEN SATURATION: 97 % | SYSTOLIC BLOOD PRESSURE: 126 MMHG | BODY MASS INDEX: 27.71 KG/M2

## 2023-09-26 DIAGNOSIS — J40 BRONCHITIS: ICD-10-CM

## 2023-09-26 DIAGNOSIS — R05.1 ACUTE COUGH: ICD-10-CM

## 2023-09-26 DIAGNOSIS — J01.00 ACUTE NON-RECURRENT MAXILLARY SINUSITIS: ICD-10-CM

## 2023-09-26 DIAGNOSIS — J02.9 SORE THROAT: ICD-10-CM

## 2023-09-26 LAB
EXPIRATION DATE: NORMAL
EXPIRATION DATE: NORMAL
FLUAV AG UPPER RESP QL IA.RAPID: NOT DETECTED
FLUBV AG UPPER RESP QL IA.RAPID: NOT DETECTED
INTERNAL CONTROL: NORMAL
INTERNAL CONTROL: NORMAL
Lab: NORMAL
Lab: NORMAL
S PYO AG THROAT QL: NEGATIVE
SARS-COV-2 AG UPPER RESP QL IA.RAPID: NOT DETECTED

## 2023-09-26 RX ORDER — PREDNISONE 20 MG/1
TABLET ORAL
Qty: 9 TABLET | Refills: 0 | Status: SHIPPED | OUTPATIENT
Start: 2023-09-26

## 2023-09-26 RX ORDER — CEFDINIR 300 MG/1
300 CAPSULE ORAL 2 TIMES DAILY
Qty: 20 CAPSULE | Refills: 0 | Status: SHIPPED | OUTPATIENT
Start: 2023-09-26 | End: 2023-10-06

## 2023-09-26 RX ORDER — DEXTROMETHORPHAN HYDROBROMIDE AND PROMETHAZINE HYDROCHLORIDE 15; 6.25 MG/5ML; MG/5ML
5 SYRUP ORAL 4 TIMES DAILY PRN
Qty: 180 ML | Refills: 0 | Status: SHIPPED | OUTPATIENT
Start: 2023-09-26

## 2023-09-26 RX ORDER — ALBUTEROL SULFATE 90 UG/1
2 AEROSOL, METERED RESPIRATORY (INHALATION) EVERY 4 HOURS PRN
Qty: 18 G | Refills: 1 | Status: SHIPPED | OUTPATIENT
Start: 2023-09-26

## 2023-09-26 NOTE — PROGRESS NOTES
".Chief Complaint  Nasal Congestion (Head and chest congestion, started Sat/Sun with watery and runny nose and sneezing. ), Cough (Pt reports slightly productive cough, with greenish colored phlegm. ), and Sore Throat    Subjective          History of Present Illness  Betty Ann Hume is here today with nasal congestion and cough    Patient states that she started feeling sick yesterday and on and today.  She states that she noticed about 4 days ago she had increased sneezing and runny nose.  She began having sore throat congestion and cough as of 2 days ago.  States that she has had no fever she states that she has noticed increasing discolored drainage.  Patient states she is coughing all the time and the cough kept her up all last night.  She states that she has been diagnosed with bronchitis in the past but is never been diagnosed with asthma or COPD.  She states that she has not used inhalers in the past.  She states that she has not been very hungry but denies any nausea or vomiting. She is a current smoker    Objective   Vital Signs:   /80   Pulse 82   Temp 98.4 °F (36.9 °C) (Oral)   Ht 157.5 cm (62\")   Wt 68.3 kg (150 lb 9.6 oz)   SpO2 97%   BMI 27.55 kg/m²     Body mass index is 27.55 kg/m².      Review of Systems      Current Outpatient Medications:     aspirin (aspirin) 81 MG EC tablet, Take 1 tablet by mouth Daily., Disp: 30 tablet, Rfl: 0    cyclobenzaprine (FLEXERIL) 10 MG tablet, Take 1 tablet by mouth 3 (Three) Times a Day As Needed for Muscle Spasms., Disp: 20 tablet, Rfl: 0    DULoxetine (CYMBALTA) 20 MG capsule, TAKE 1 CAPSULE TWICE A DAY (Patient taking differently: 1 capsule. Taking 1 one time  a day), Disp: 60 capsule, Rfl: 0    Evolocumab (REPATHA) solution auto-injector SureClick injection, Inject 1 mL under the skin into the appropriate area as directed Every 14 (Fourteen) Days., Disp: 6 mL, Rfl: 3    ramipril (Altace) 2.5 MG capsule, Take 1 capsule by mouth Daily., Disp: 90 " capsule, Rfl: 3    albuterol sulfate  (90 Base) MCG/ACT inhaler, Inhale 2 puffs Every 4 (Four) Hours As Needed for Wheezing or Shortness of Air., Disp: 18 g, Rfl: 1    cefdinir (OMNICEF) 300 MG capsule, Take 1 capsule by mouth 2 (Two) Times a Day for 10 days., Disp: 20 capsule, Rfl: 0    predniSONE (DELTASONE) 20 MG tablet, 2 tab po x 3 days then 1 tab po qd x 3 days, Disp: 9 tablet, Rfl: 0    promethazine-dextromethorphan (PROMETHAZINE-DM) 6.25-15 MG/5ML syrup, Take 5 mL by mouth 4 (Four) Times a Day As Needed for Cough., Disp: 180 mL, Rfl: 0    Allergies: Erythromycin, Shellfish-derived products, Statins, and Latex    Physical Exam     Result Review :     POCT SARS-CoV-2 Antigen KAMILA (09/26/2023 16:07)  POC Rapid Strep A (09/26/2023 16:06)              Assessment and Plan    Diagnoses and all orders for this visit:    1. Bronchitis  -     predniSONE (DELTASONE) 20 MG tablet; 2 tab po x 3 days then 1 tab po qd x 3 days  Dispense: 9 tablet; Refill: 0  -     albuterol sulfate  (90 Base) MCG/ACT inhaler; Inhale 2 puffs Every 4 (Four) Hours As Needed for Wheezing or Shortness of Air.  Dispense: 18 g; Refill: 1  -     promethazine-dextromethorphan (PROMETHAZINE-DM) 6.25-15 MG/5ML syrup; Take 5 mL by mouth 4 (Four) Times a Day As Needed for Cough.  Dispense: 180 mL; Refill: 0  Discussed with patient that her COVID and strep test today are negative.  We will place her on prednisone as well as albuterol and promethazine to help with her bronchitis as well as her cough.  We have also placed her on Omnicef please see below.  2. Acute non-recurrent maxillary sinusitis  -     cefdinir (OMNICEF) 300 MG capsule; Take 1 capsule by mouth 2 (Two) Times a Day for 10 days.  Dispense: 20 capsule; Refill: 0  -     predniSONE (DELTASONE) 20 MG tablet; 2 tab po x 3 days then 1 tab po qd x 3 days  Dispense: 9 tablet; Refill: 0  We will start Omnicef as well as prednisone.  3. Acute cough  -     promethazine-dextromethorphan  (PROMETHAZINE-DM) 6.25-15 MG/5ML syrup; Take 5 mL by mouth 4 (Four) Times a Day As Needed for Cough.  Dispense: 180 mL; Refill: 0  -     POC Rapid Strep A  -     POCT SARS-CoV-2 Antigen KAMILA    4. Sore throat  -     POC Rapid Strep A  -     POCT SARS-CoV-2 Antigen KAMILA        Follow Up   No follow-ups on file.  Patient was given instructions and counseling regarding her condition or for health maintenance advice. Please see specific information pulled into the AVS if appropriate.     ANA LILIA Herndon  09/26/2023

## 2023-09-26 NOTE — LETTER
September 26, 2023     Patient: Betty Ann Hume   YOB: 1965   Date of Visit: 9/26/2023       To Whom It May Concern:    It is my medical opinion that Betty Hume may return to work in three days- on Friday 9-29-23.            Sincerely,        Goldie Mcdermott PA-C    CC: No Recipients

## 2023-09-27 PROBLEM — Z96.652 HISTORY OF TOTAL KNEE REPLACEMENT, LEFT: Status: ACTIVE | Noted: 2022-11-17

## 2023-10-20 ENCOUNTER — OFFICE VISIT (OUTPATIENT)
Dept: FAMILY MEDICINE CLINIC | Facility: CLINIC | Age: 58
End: 2023-10-20
Payer: COMMERCIAL

## 2023-10-20 VITALS
DIASTOLIC BLOOD PRESSURE: 72 MMHG | OXYGEN SATURATION: 94 % | BODY MASS INDEX: 27.4 KG/M2 | WEIGHT: 148.9 LBS | HEART RATE: 74 BPM | SYSTOLIC BLOOD PRESSURE: 116 MMHG | HEIGHT: 62 IN

## 2023-10-20 DIAGNOSIS — H60.311 ACUTE DIFFUSE OTITIS EXTERNA OF RIGHT EAR: Primary | ICD-10-CM

## 2023-10-20 PROCEDURE — 99213 OFFICE O/P EST LOW 20 MIN: CPT | Performed by: STUDENT IN AN ORGANIZED HEALTH CARE EDUCATION/TRAINING PROGRAM

## 2023-10-20 RX ORDER — DOXYCYCLINE HYCLATE 100 MG/1
100 CAPSULE ORAL 2 TIMES DAILY
Qty: 20 CAPSULE | Refills: 0 | Status: SHIPPED | OUTPATIENT
Start: 2023-10-20

## 2023-10-20 RX ORDER — CIPROFLOXACIN AND DEXAMETHASONE 3; 1 MG/ML; MG/ML
4 SUSPENSION/ DROPS AURICULAR (OTIC) 2 TIMES DAILY
Qty: 7.5 ML | Refills: 0 | Status: SHIPPED | OUTPATIENT
Start: 2023-10-20

## 2023-10-20 RX ORDER — FLUCONAZOLE 150 MG/1
150 TABLET ORAL ONCE
Qty: 1 TABLET | Refills: 0 | Status: SHIPPED | OUTPATIENT
Start: 2023-10-20 | End: 2023-10-20

## 2023-10-20 NOTE — PROGRESS NOTES
"Chief Complaint  Earache (Pt here today for extremely painful earache with pain and swelling to ear and neck/lymph nodes. ), Conjunctivitis (Pt states she woke up this morning with rt eye matted and red and irritated. She reports \"it feels like there is something in it\"), Headache, and Sinusitis (Pt thinks she may have sinus infection as well. )    Subjective          Betty Ann Hume presents to [facility]  Earache   Associated symptoms include headaches.   Conjunctivitis   Associated symptoms include ear pain and headaches.   Headache  Sinusitis  Associated symptoms include ear pain and headaches.       Patient presents to the office for recheck after being treated for bronchitis. She states that she did improve some, but has had worsening pain in the right ear with swelling, muffled sounds, and severe pain with any movement of the pinna. She states that she is also concerned about sinusitis on the right due to the swelling and pain in the right cheek.         Objective   Vital Signs:   /72   Pulse 74   Ht 157.5 cm (62\")   Wt 67.5 kg (148 lb 14.4 oz)   SpO2 94%   BMI 27.23 kg/m²     Body mass index is 27.23 kg/m².    Review of Systems   HENT:  Positive for ear pain.        Past History:  Medical History: has a past medical history of Abnormality of right breast on screening mammogram (11/12/2019), Anemia, ASCUS with positive high risk HPV cervical (07/29/2019), Cervical dysplasia, Diverticulitis, Headache, HPV (human papilloma virus) infection, Hypercholesterolemia, Hypertension, and IBS (irritable bowel syndrome).   Surgical History: has a past surgical history that includes Knee surgery (Right, 2008); Tubal ligation (2003); Breast Augmentation (2004); Knee arthroscopy (Right, 05/20/2019); Augmentation mammaplasty; Total knee arthroplasty (Right, 05/2020); and Knee surgery (Left).   Family History: family history includes Asthma in her mother; Colon cancer in her maternal " grandmother; Diabetes in her father and mother; Heart disease in her father, mother, and sister; Hodgkin's lymphoma in her sister; Hypertension in her father, mother, and sister; Lupus in her sister; Osteoporosis in her mother and sister; Skin cancer in her brother; Stroke in her mother; Thyroid disease in her father, mother, and sister.   Social History: reports that she has been smoking cigarettes. She has a 12.50 pack-year smoking history. She has never used smokeless tobacco. She reports current alcohol use. She reports that she does not use drugs.      Current Outpatient Medications:     albuterol sulfate  (90 Base) MCG/ACT inhaler, Inhale 2 puffs Every 4 (Four) Hours As Needed for Wheezing or Shortness of Air., Disp: 18 g, Rfl: 1    aspirin (aspirin) 81 MG EC tablet, Take 1 tablet by mouth Daily., Disp: 30 tablet, Rfl: 0    cyclobenzaprine (FLEXERIL) 10 MG tablet, Take 1 tablet by mouth 3 (Three) Times a Day As Needed for Muscle Spasms., Disp: 20 tablet, Rfl: 0    DULoxetine (CYMBALTA) 20 MG capsule, TAKE 1 CAPSULE TWICE A DAY (Patient taking differently: 1 capsule. Taking 1 one time  a day), Disp: 60 capsule, Rfl: 0    Evolocumab (REPATHA) solution auto-injector SureClick injection, Inject 1 mL under the skin into the appropriate area as directed Every 14 (Fourteen) Days., Disp: 6 mL, Rfl: 3    ramipril (Altace) 2.5 MG capsule, Take 1 capsule by mouth Daily., Disp: 90 capsule, Rfl: 3    ciprofloxacin-dexAMETHasone (Ciprodex) 0.3-0.1 % otic suspension, Administer 4 drops into both ears 2 (Two) Times a Day., Disp: 7.5 mL, Rfl: 0    doxycycline (VIBRAMYCIN) 100 MG capsule, Take 1 capsule by mouth 2 (Two) Times a Day., Disp: 20 capsule, Rfl: 0    fluconazole (Diflucan) 150 MG tablet, Take 1 tablet by mouth 1 (One) Time for 1 dose., Disp: 1 tablet, Rfl: 0    promethazine-dextromethorphan (PROMETHAZINE-DM) 6.25-15 MG/5ML syrup, Take 5 mL by mouth 4 (Four) Times a Day As Needed for Cough. (Patient not taking:  Reported on 10/20/2023), Disp: 180 mL, Rfl: 0    Allergies: Erythromycin, Shellfish-derived products, Statins, and Latex    Physical Exam  Constitutional:       General: She is not in acute distress.     Appearance: She is not ill-appearing or toxic-appearing.   HENT:      Head: Normocephalic and atraumatic.      Ears:      Comments: R TM unable to be visualized 2/2 severe swelling of the pinna and the EAC. Pain with movement and palpation to the per and post aricular areas.   Cardiovascular:      Rate and Rhythm: Normal rate and regular rhythm.      Heart sounds: No murmur heard.  Pulmonary:      Effort: Pulmonary effort is normal. No respiratory distress.   Neurological:      General: No focal deficit present.      Mental Status: She is alert and oriented to person, place, and time.   Psychiatric:         Mood and Affect: Mood normal.         Thought Content: Thought content normal.          Result Review :                   Assessment and Plan    Diagnoses and all orders for this visit:    1. Acute diffuse otitis externa of right ear (Primary)    Other orders  -     ciprofloxacin-dexAMETHasone (Ciprodex) 0.3-0.1 % otic suspension; Administer 4 drops into both ears 2 (Two) Times a Day.  Dispense: 7.5 mL; Refill: 0  -     doxycycline (VIBRAMYCIN) 100 MG capsule; Take 1 capsule by mouth 2 (Two) Times a Day.  Dispense: 20 capsule; Refill: 0  -     fluconazole (Diflucan) 150 MG tablet; Take 1 tablet by mouth 1 (One) Time for 1 dose.  Dispense: 1 tablet; Refill: 0    Will treat with Ciprodex and Doxy. She does have erythema of the TM on the left, and with the recent treatment for Bronchitis will treat for the full 10 days. Tylenol/Motrin for pain/fever. OTC cough and cold medication for symptoms. Nasal saline spray recommended. Cool mist humidifier at the bedside. RTC with new or worsening symptoms.        Follow Up   No follow-ups on file.  Patient was given instructions and counseling regarding her condition or for  health maintenance advice. Please see specific information pulled into the AVS if appropriate.     Carly Birmingham, DO

## 2023-11-09 ENCOUNTER — OFFICE VISIT (OUTPATIENT)
Dept: FAMILY MEDICINE CLINIC | Facility: CLINIC | Age: 58
End: 2023-11-09
Payer: COMMERCIAL

## 2023-11-09 VITALS
WEIGHT: 147.8 LBS | BODY MASS INDEX: 27.9 KG/M2 | HEIGHT: 61 IN | HEART RATE: 79 BPM | SYSTOLIC BLOOD PRESSURE: 132 MMHG | DIASTOLIC BLOOD PRESSURE: 84 MMHG | OXYGEN SATURATION: 97 %

## 2023-11-09 DIAGNOSIS — M54.42 ACUTE LEFT-SIDED LOW BACK PAIN WITH LEFT-SIDED SCIATICA: Primary | ICD-10-CM

## 2023-11-09 NOTE — LETTER
November 9, 2023     Patient: Betty Ann Hume   YOB: 1965   Date of Visit: 11/9/2023       To Whom It May Concern:     Betty Hume was seen in my office today. She may return to work in three days. She is to be excused from today 11-9-23 thru Monday 11-13-23           Sincerely,        Goldie Mcdermott PA-C    CC: No Recipients

## 2023-11-09 NOTE — PROGRESS NOTES
".Chief Complaint  Back Pain (Pt here today for lower back pain. Pt has done a couple of weeks of PT, which did give relief and helped. Would like to do PT again as the lower back has gotten tight again and having increased pain. )    Subjective          History of Present Illness  Betty Ann Hume is here today with back pain  Patient states that she had been in physical therapy for her back pain and did quite well.  She states she was seeing [facility] in [place].  She states that she is noticed that her back will sometimes feel like it is grabbing when she moves a certain way.  She states she would like to avoid the pain she was in the first time around with her back and would like to start PT again in order to prevent it.  She states she takes Aleve on occasion.  She states she does have some trouble getting sleeping getting comfortable with her back pain.  She states that she does not want any pain pills or muscle relaxants.  She states that she has several family members with addictions to pain pills and would like to avoid them entirely.    Objective   Vital Signs:   /84   Pulse 79   Ht 154.9 cm (61\")   Wt 67 kg (147 lb 12.8 oz)   SpO2 97%   BMI 27.93 kg/m²     Body mass index is 27.93 kg/m².      Review of Systems      Current Outpatient Medications:     aspirin (aspirin) 81 MG EC tablet, Take 1 tablet by mouth Daily., Disp: 30 tablet, Rfl: 0    doxycycline (VIBRAMYCIN) 100 MG capsule, Take 1 capsule by mouth 2 (Two) Times a Day., Disp: 20 capsule, Rfl: 0    DULoxetine (CYMBALTA) 20 MG capsule, TAKE 1 CAPSULE TWICE A DAY (Patient taking differently: 1 capsule. Taking 1 one time  a day), Disp: 60 capsule, Rfl: 0    Evolocumab (REPATHA) solution auto-injector SureClick injection, Inject 1 mL under the skin into the appropriate area as directed Every 14 (Fourteen) Days., Disp: 6 mL, Rfl: 3    ramipril (Altace) 2.5 MG capsule, Take 1 capsule by mouth Daily., Disp: 90 capsule, Rfl: 3    albuterol sulfate "  (90 Base) MCG/ACT inhaler, Inhale 2 puffs Every 4 (Four) Hours As Needed for Wheezing or Shortness of Air. (Patient not taking: Reported on 11/9/2023), Disp: 18 g, Rfl: 1    ciprofloxacin-dexAMETHasone (Ciprodex) 0.3-0.1 % otic suspension, Administer 4 drops into both ears 2 (Two) Times a Day. (Patient not taking: Reported on 11/9/2023), Disp: 7.5 mL, Rfl: 0    cyclobenzaprine (FLEXERIL) 10 MG tablet, Take 1 tablet by mouth 3 (Three) Times a Day As Needed for Muscle Spasms. (Patient not taking: Reported on 11/9/2023), Disp: 20 tablet, Rfl: 0    promethazine-dextromethorphan (PROMETHAZINE-DM) 6.25-15 MG/5ML syrup, Take 5 mL by mouth 4 (Four) Times a Day As Needed for Cough. (Patient not taking: Reported on 10/20/2023), Disp: 180 mL, Rfl: 0    Allergies: Erythromycin, Shellfish-derived products, Statins, and Latex    Physical Exam  Vitals and nursing note reviewed.   Constitutional:       General: She is not in acute distress.     Appearance: Normal appearance. She is normal weight. She is not ill-appearing, toxic-appearing or diaphoretic.   HENT:      Head: Normocephalic and atraumatic.   Pulmonary:      Effort: Pulmonary effort is normal.   Musculoskeletal:        Back:    Neurological:      General: No focal deficit present.      Mental Status: She is alert.        Result Review :                   Assessment and Plan    Diagnoses and all orders for this visit:    1. Acute left-sided low back pain with left-sided sciatica (Primary)  -     Ambulatory Referral to Physical Therapy    Encouraged gentle stretching as well as ibuprofen.  She may certainly use ice or heat to the area as well.  We will certainly get her back in with physical therapy.    Follow Up   No follow-ups on file.  Patient was given instructions and counseling regarding her condition or for health maintenance advice. Please see specific information pulled into the AVS if appropriate.     ANA LILIA Herndon  11/09/2023

## 2024-02-14 ENCOUNTER — OFFICE VISIT (OUTPATIENT)
Dept: FAMILY MEDICINE CLINIC | Facility: CLINIC | Age: 59
End: 2024-02-14
Payer: COMMERCIAL

## 2024-02-14 VITALS
BODY MASS INDEX: 28.21 KG/M2 | WEIGHT: 149.44 LBS | SYSTOLIC BLOOD PRESSURE: 122 MMHG | DIASTOLIC BLOOD PRESSURE: 84 MMHG | OXYGEN SATURATION: 97 % | HEART RATE: 88 BPM | HEIGHT: 61 IN

## 2024-02-14 DIAGNOSIS — Z79.899 ENCOUNTER FOR LONG-TERM (CURRENT) USE OF OTHER MEDICATIONS: ICD-10-CM

## 2024-02-14 DIAGNOSIS — M79.672 LEFT FOOT PAIN: Primary | ICD-10-CM

## 2024-02-14 DIAGNOSIS — E66.3 OVERWEIGHT (BMI 25.0-29.9): ICD-10-CM

## 2024-02-14 LAB — GLUCOSE BLDC GLUCOMTR-MCNC: 94 MG/DL (ref 70–130)

## 2024-02-14 RX ORDER — PREDNISONE 20 MG/1
TABLET ORAL
Qty: 18 TABLET | Refills: 0 | Status: SHIPPED | OUTPATIENT
Start: 2024-02-14 | End: 2024-02-23

## 2024-02-14 RX ORDER — SEMAGLUTIDE 0.25 MG/.5ML
0.25 INJECTION, SOLUTION SUBCUTANEOUS WEEKLY
Qty: 2 ML | Refills: 0 | Status: SHIPPED | OUTPATIENT
Start: 2024-02-14

## 2024-02-15 ENCOUNTER — TELEPHONE (OUTPATIENT)
Dept: FAMILY MEDICINE CLINIC | Facility: CLINIC | Age: 59
End: 2024-02-15
Payer: COMMERCIAL

## 2024-02-15 LAB
ALBUMIN SERPL-MCNC: 4.5 G/DL (ref 3.8–4.9)
ALBUMIN/GLOB SERPL: 1.9 {RATIO} (ref 1.2–2.2)
ALP SERPL-CCNC: 95 IU/L (ref 44–121)
ALT SERPL-CCNC: 10 IU/L (ref 0–32)
AST SERPL-CCNC: 15 IU/L (ref 0–40)
BASOPHILS # BLD AUTO: 0.1 X10E3/UL (ref 0–0.2)
BASOPHILS NFR BLD AUTO: 1 %
BILIRUB SERPL-MCNC: <0.2 MG/DL (ref 0–1.2)
BUN SERPL-MCNC: 20 MG/DL (ref 6–24)
BUN/CREAT SERPL: 33 (ref 9–23)
CALCIUM SERPL-MCNC: 9.4 MG/DL (ref 8.7–10.2)
CHLORIDE SERPL-SCNC: 101 MMOL/L (ref 96–106)
CO2 SERPL-SCNC: 24 MMOL/L (ref 20–29)
CREAT SERPL-MCNC: 0.6 MG/DL (ref 0.57–1)
EGFRCR SERPLBLD CKD-EPI 2021: 104 ML/MIN/1.73
EOSINOPHIL # BLD AUTO: 0.3 X10E3/UL (ref 0–0.4)
EOSINOPHIL NFR BLD AUTO: 3 %
ERYTHROCYTE [DISTWIDTH] IN BLOOD BY AUTOMATED COUNT: 13.5 % (ref 11.7–15.4)
GLOBULIN SER CALC-MCNC: 2.4 G/DL (ref 1.5–4.5)
GLUCOSE SERPL-MCNC: 94 MG/DL (ref 70–99)
HCT VFR BLD AUTO: 42.8 % (ref 34–46.6)
HGB BLD-MCNC: 14.4 G/DL (ref 11.1–15.9)
IMM GRANULOCYTES # BLD AUTO: 0 X10E3/UL (ref 0–0.1)
IMM GRANULOCYTES NFR BLD AUTO: 0 %
LYMPHOCYTES # BLD AUTO: 2.7 X10E3/UL (ref 0.7–3.1)
LYMPHOCYTES NFR BLD AUTO: 27 %
MCH RBC QN AUTO: 30.8 PG (ref 26.6–33)
MCHC RBC AUTO-ENTMCNC: 33.6 G/DL (ref 31.5–35.7)
MCV RBC AUTO: 92 FL (ref 79–97)
MONOCYTES # BLD AUTO: 1 X10E3/UL (ref 0.1–0.9)
MONOCYTES NFR BLD AUTO: 10 %
NEUTROPHILS # BLD AUTO: 6 X10E3/UL (ref 1.4–7)
NEUTROPHILS NFR BLD AUTO: 59 %
PLATELET # BLD AUTO: 317 X10E3/UL (ref 150–450)
POTASSIUM SERPL-SCNC: 4.6 MMOL/L (ref 3.5–5.2)
PROT SERPL-MCNC: 6.9 G/DL (ref 6–8.5)
RBC # BLD AUTO: 4.68 X10E6/UL (ref 3.77–5.28)
SODIUM SERPL-SCNC: 140 MMOL/L (ref 134–144)
TSH SERPL DL<=0.005 MIU/L-ACNC: 1.4 UIU/ML (ref 0.45–4.5)
WBC # BLD AUTO: 10.1 X10E3/UL (ref 3.4–10.8)

## 2024-02-16 ENCOUNTER — TELEPHONE (OUTPATIENT)
Dept: FAMILY MEDICINE CLINIC | Facility: CLINIC | Age: 59
End: 2024-02-16
Payer: COMMERCIAL

## 2024-02-16 DIAGNOSIS — M79.672 LEFT FOOT PAIN: Primary | ICD-10-CM

## 2024-02-16 NOTE — TELEPHONE ENCOUNTER
I would recommend that we set her up with either podiatrist or orthopedics to evaluate it further.  I have seen at times on patients where we will do x-rays here they are normal and then will go to the specialist and they will do certain types of x-rays and can notice things such as fractures based on foot position with the x-ray.  Would she like me to put that referral in?

## 2024-02-16 NOTE — TELEPHONE ENCOUNTER
----- Message from Miriam Hernandez MA sent at 2/16/2024  4:54 PM EST -----  Regarding: FW: Question regarding XR FOOT 3+ VW LEFT  Contact: [phone]    ----- Message -----  From: Hume, Betty Ann  Sent: 2/16/2024   1:20 PM EST  To: [facility]  Subject: Question regarding XR FOOT 3+ VW LEFT            So with this result, does this mean it’s something I will always have to deal with or is there anything that can be done to improve standing and walking?

## 2024-02-21 ENCOUNTER — OFFICE VISIT (OUTPATIENT)
Dept: ORTHOPEDIC SURGERY | Facility: CLINIC | Age: 59
End: 2024-02-21
Payer: COMMERCIAL

## 2024-02-21 VITALS
HEIGHT: 61 IN | BODY MASS INDEX: 28.22 KG/M2 | SYSTOLIC BLOOD PRESSURE: 128 MMHG | DIASTOLIC BLOOD PRESSURE: 74 MMHG | WEIGHT: 149.47 LBS

## 2024-02-21 DIAGNOSIS — M19.072 PRIMARY OSTEOARTHRITIS OF LEFT FOOT: ICD-10-CM

## 2024-02-21 DIAGNOSIS — M79.672 LEFT FOOT PAIN: Primary | ICD-10-CM

## 2024-02-21 NOTE — PROGRESS NOTES
[facility] Note        Subjective     Pain of the Left Foot      HPI    Betty Ann Hume is a 59 y.o. female. This is a very pleasant patient here to discuss her left dorsal foot pain.  No trauma or injury.  She complains of pain with WB and walking, worse in a shoe.  She has pain from pressure over the dorsum of the foot which is worse as the day progresses.  It has been bothering her for about 3 to 4 weeks.  She has aching and stabbing pain and is on her feet all day.  She has treated with activity modification and nsaids without much relief.  Here for further evaluation and treatment recommendations. She report she has had similar problems on the right in the past.     Past Medical History:   Diagnosis Date    Abnormality of right breast on screening mammogram 11/12/2019    Anemia     ASCUS with positive high risk HPV cervical 07/29/2019    Cervical dysplasia     Diverticulitis     Headache     SINUS HEADACHE / MIGRAINE    HPV (human papilloma virus) infection     Hypercholesterolemia     Hypertension     IBS (irritable bowel syndrome)     Screening for breast cancer 05/12/2022      Past Surgical History:   Procedure Laterality Date    AUGMENTATION MAMMAPLASTY      BREAST AUGMENTATION  2004    DOMINIQUE LATERAL    KNEE ARTHROSCOPY Right 05/20/2019    turned into much more complicated    KNEE SURGERY Right 2008    KNEE SURGERY Left     TOTAL KNEE ARTHROPLASTY Right 05/2020    TUBAL ABDOMINAL LIGATION  2003      Family History   Problem Relation Age of Onset    Osteoporosis Mother     Thyroid disease Mother     Diabetes Mother     Heart disease Mother     Hypertension Mother     Stroke Mother     Asthma Mother     Thyroid disease Father     Diabetes Father     Heart disease Father     Hypertension Father     Osteoporosis Sister     Lupus Sister     Thyroid disease Sister     Heart disease Sister     Hypertension Sister     Colon cancer Maternal Grandmother         late 60's    Hodgkin's  lymphoma Sister         NON    Skin cancer Brother     Breast cancer Neg Hx     Ovarian cancer Neg Hx     Uterine cancer Neg Hx      Social History     Socioeconomic History    Marital status:     Number of children: 2   Tobacco Use    Smoking status: Every Day     Packs/day: 0.50     Years: 25.00     Additional pack years: 0.00     Total pack years: 12.50     Types: Cigarettes    Smokeless tobacco: Never   Vaping Use    Vaping Use: Never used   Substance and Sexual Activity    Alcohol use: Yes     Comment: OCC    Drug use: No    Sexual activity: Yes     Partners: Male     Birth control/protection: Post-menopausal     Comment: 1x per month      Current Outpatient Medications on File Prior to Visit   Medication Sig Dispense Refill    aspirin (aspirin) 81 MG EC tablet Take 1 tablet by mouth Daily. 30 tablet 0    ciprofloxacin-dexAMETHasone (Ciprodex) 0.3-0.1 % otic suspension Administer 4 drops into both ears 2 (Two) Times a Day. 7.5 mL 0    DULoxetine (CYMBALTA) 20 MG capsule TAKE 1 CAPSULE TWICE A DAY (Patient taking differently: 1 capsule. Taking 1 one time  a day) 60 capsule 0    predniSONE (DELTASONE) 20 MG tablet Take 3 tablets by mouth Daily for 3 days, THEN 2 tablets Daily for 3 days, THEN 1 tablet Daily for 3 days. 18 tablet 0    ramipril (Altace) 2.5 MG capsule Take 1 capsule by mouth Daily. 90 capsule 3    Semaglutide-Weight Management (Wegovy) 0.25 MG/0.5ML solution auto-injector Inject 0.5 mL under the skin into the appropriate area as directed 1 (One) Time Per Week. Call for next dose. 2 mL 0     No current facility-administered medications on file prior to visit.      Allergies   Allergen Reactions    Erythromycin Anaphylaxis, Swelling and Unknown (See Comments)    Shellfish-Derived Products Anaphylaxis    Statins Other (See Comments)     Severe body aches     Latex Rash          Review of Systems   Constitutional:  Negative for activity change, appetite change, chills, diaphoresis, fatigue,  fever and unexpected weight change.   HENT:  Negative for congestion, dental problem, drooling, ear discharge, ear pain, facial swelling, hearing loss, mouth sores, nosebleeds, postnasal drip, rhinorrhea, sinus pressure, sneezing, sore throat, tinnitus, trouble swallowing and voice change.    Eyes:  Negative for photophobia, pain, discharge, redness, itching and visual disturbance.   Respiratory:  Negative for apnea, cough, choking, chest tightness, shortness of breath, wheezing and stridor.    Cardiovascular:  Negative for chest pain, palpitations and leg swelling.   Gastrointestinal:  Negative for abdominal distention, abdominal pain, anal bleeding, blood in stool, constipation, diarrhea, nausea, rectal pain and vomiting.   Endocrine: Negative for cold intolerance, heat intolerance, polydipsia, polyphagia and polyuria.   Genitourinary:  Negative for decreased urine volume, difficulty urinating, dysuria, enuresis, flank pain, frequency, genital sores, hematuria and urgency.   Musculoskeletal:  Positive for arthralgias. Negative for back pain, gait problem, joint swelling, myalgias, neck pain and neck stiffness.   Skin:  Negative for color change, pallor, rash and wound.   Allergic/Immunologic: Negative for environmental allergies, food allergies and immunocompromised state.   Neurological:  Negative for dizziness, tremors, seizures, syncope, facial asymmetry, speech difficulty, weakness, light-headedness, numbness and headaches.   Hematological:  Negative for adenopathy. Does not bruise/bleed easily.   Psychiatric/Behavioral:  Negative for agitation, behavioral problems, confusion, decreased concentration, dysphoric mood, hallucinations, self-injury, sleep disturbance and suicidal ideas. The patient is not nervous/anxious and is not hyperactive.         I reviewed the patient's chief complaint, history of present illness, review of systems, past medical history, surgical history, family history, social history,  "medications and allergy list.        Objective      Physical Exam  /74   Ht 154.9 cm (61\")   Wt 67.8 kg (149 lb 7.6 oz)   LMP  (LMP Unknown)   BMI 28.24 kg/m²     Body mass index is 28.24 kg/m².    General  Mental Status - alert  General Appearance - cooperative, well groomed, not in acute distress  Orientation - Oriented X3  Build & Nutrition - well developed and well nourished  Posture - normal posture  Gait - mildly antalgic       Ortho Exam  V:  Dorsalis Pedis:  Right: 2+; Left:2+    Posterior Tibial: Right:2+; Left:2+    Capillary Refill:  Brisk  MSK:      Tibia:  Right:  tender over subcutaneous border; Left:  tender over subcutaneous border      Ankle:  Right: non tender, ROM  normal, and motor function  normal; Left:  non tender, ROM  normal, and motor function  normal      Foot:  Right:  non tender, ROM  normal, and motor function  normal; Left:  tender over the midfoot, ROM  normal, and motor function  normal      NEURO: Heel Walking:  Right:  normal; Left:  normal    Toe Walking:  Right:  normal; Left:  normal     [place] 5.07 monofilament test: not evaluated    Lower extremity sensation: intact     Calf Atrophy:none    Motor Function: all 5/5        Imaging/Studies  Imaging Results (Last 24 Hours)       Procedure Component Value Units Date/Time    XR Foot 3+ View Left [[id number]] Resulted: 02/21/24 1437     Updated: 02/21/24 1438    Narrative:      Left Foot X-Ray 02/21/24   Indication: Pain  Views: 3 weight bearing , comparison to previous  Findings: xrays reviewed by me today in the office and show no acute   osseous abnormality, degenerative changes consistent with age                Assessment    Assessment:  1. Left foot pain    2. Primary osteoarthritis of left foot          Plan:  Recommend over-the-counter medication as needed for discomfort  Left midfoot arthritis.  I reviewed today's x-rays, clinical findings, past and current treatment with the patient.  On exam, she is " tender over the midfoot with no swelling or concern for anything worrisome.  I think her pain and function limitations are secondary to midfoot arthritis.  She reports similar pain in the right foot in the past.  We discussed further treatment options including topical anti-inflammatories, custom orthotics to support her joints, continued oral anti-inflammatories and cortisone injection in the future if indicated.  I taught her how to lace her shoes in order to avoid pressure over the midfoot.  I do think she would also benefit from knee-high compression stockings during the day and off at night she reports she reports she is unable to tolerate these.  She also reports that she is not able to afford custom orthotics.  I explained that if she gets over-the-counter when she needs to make sure that they are full-length and cork soft material and that hard plastic.  She will return to see me as needed.            Sandra Overton PA-C  02/22/24  14:13 EST

## 2024-03-01 ENCOUNTER — TELEPHONE (OUTPATIENT)
Dept: FAMILY MEDICINE CLINIC | Facility: CLINIC | Age: 59
End: 2024-03-01
Payer: COMMERCIAL

## 2024-03-01 DIAGNOSIS — F32.A ANXIETY AND DEPRESSION: ICD-10-CM

## 2024-03-01 DIAGNOSIS — F41.9 ANXIETY AND DEPRESSION: ICD-10-CM

## 2024-03-01 DIAGNOSIS — E66.3 OVERWEIGHT (BMI 25.0-29.9): ICD-10-CM

## 2024-03-01 RX ORDER — RAMIPRIL 2.5 MG/1
2.5 CAPSULE ORAL DAILY
Qty: 90 CAPSULE | Refills: 3 | Status: SHIPPED | OUTPATIENT
Start: 2024-03-01

## 2024-03-01 RX ORDER — DULOXETIN HYDROCHLORIDE 20 MG/1
20 CAPSULE, DELAYED RELEASE ORAL 2 TIMES DAILY
Qty: 60 CAPSULE | Refills: 0 | Status: SHIPPED | OUTPATIENT
Start: 2024-03-01

## 2024-03-01 NOTE — TELEPHONE ENCOUNTER
Name: Hume, Betty Ann      Relationship: Self      Best Callback Number: [phone]      HUB PROVIDED THE RELAY MESSAGE FROM THE OFFICE  HUB TO RELAY     Does pt take cymbalta one time a day for two times?     Message left            PATIENT: VOICED UNDERSTANDING AND HAS NO FURTHER QUESTIONS AT THIS TIME    ADDITIONAL INFORMATION:    TIERNEY SAYS SHE TAKES IT TWICE DAILY.

## 2024-03-01 NOTE — TELEPHONE ENCOUNTER
Caller: Hume, Betty Ann    Relationship: Self    Best call back number:[phone]    Requested Prescriptions:   Requested Prescriptions     Pending Prescriptions Disp Refills    ramipril (Altace) 2.5 MG capsule 90 capsule 3     Sig: Take 1 capsule by mouth Daily.        Pharmacy where request should be sent:      Last office visit with prescribing clinician: 9/20/2023   Last telemedicine visit with prescribing clinician: Visit date not found   Next office visit with prescribing clinician: 3/19/2024         Does the patient have less than a 3 day supply:  [] Yes  [x] No    Would you like a call back once the refill request has been completed: [] Yes [x] No    If the office needs to give you a call back, can they leave a voicemail: [] Yes [x] No    Abdiaziz Combs Rep   03/01/24 11:49 EST

## 2024-03-01 NOTE — TELEPHONE ENCOUNTER
Caller: Hume, Betty Ann    Relationship: Self    Best call back number: [phone]    What medication are you requesting: OMEPRAZOLE    What are your current symptoms: REFLUX    How long have you been experiencing symptoms: SEVERAL YEARS    Have you had these symptoms before:    [x] Yes  [] No    Have you been treated for these symptoms before:   [x] Yes  [] No    If a prescription is needed, what is your preferred pharmacy and phone number:  [facility]     Additional notes: THE PATIENT HAS STOPPED TAKING THIS FOR AWHILE AND NEEDS TO TAKE IT AGAIN.

## 2024-03-01 NOTE — TELEPHONE ENCOUNTER
Caller: Hume, Betty Ann    Relationship: Self    Best call back number: [phone]     Requested Prescriptions:   Requested Prescriptions     Pending Prescriptions Disp Refills    DULoxetine (CYMBALTA) 20 MG capsule 60 capsule 0     Sig: Take 1 capsule by mouth 2 (Two) Times a Day.    Semaglutide-Weight Management (Wegovy) 0.25 MG/0.5ML solution auto-injector 2 mL 0     Sig: Inject 0.5 mL under the skin into the appropriate area as directed 1 (One) Time Per Week. Call for next dose.        Pharmacy where request should be sent: [facility] [address] [phone] [facility] [phone] FX     Last office visit with prescribing clinician: 2/14/2024   Last telemedicine visit with prescribing clinician: Visit date not found   Next office visit with prescribing clinician: Visit date not found     Additional details provided by patient:     Does the patient have less than a 3 day supply:  [x] Yes  [] No    Would you like a call back once the refill request has been completed: [] Yes [x] No    If the office needs to give you a call back, can they leave a voicemail: [] Yes [x] No    Abdiaziz Castanon Rep   03/01/24 09:29 EST

## 2024-03-04 RX ORDER — SEMAGLUTIDE 0.25 MG/.5ML
0.25 INJECTION, SOLUTION SUBCUTANEOUS WEEKLY
Qty: 2 ML | Refills: 0 | OUTPATIENT
Start: 2024-03-04

## 2024-03-04 NOTE — TELEPHONE ENCOUNTER
Pt contacted and states that she is picking up the first injection today because they had been out of stock

## 2024-03-19 ENCOUNTER — OFFICE VISIT (OUTPATIENT)
Dept: CARDIOLOGY | Facility: CLINIC | Age: 59
End: 2024-03-19
Payer: COMMERCIAL

## 2024-03-19 ENCOUNTER — TELEPHONE (OUTPATIENT)
Dept: CARDIOLOGY | Facility: CLINIC | Age: 59
End: 2024-03-19

## 2024-03-19 VITALS
DIASTOLIC BLOOD PRESSURE: 70 MMHG | HEIGHT: 61 IN | BODY MASS INDEX: 27.38 KG/M2 | WEIGHT: 145 LBS | HEART RATE: 86 BPM | OXYGEN SATURATION: 99 % | SYSTOLIC BLOOD PRESSURE: 130 MMHG | RESPIRATION RATE: 18 BRPM

## 2024-03-19 DIAGNOSIS — I73.9 PVD (PERIPHERAL VASCULAR DISEASE) WITH CLAUDICATION: ICD-10-CM

## 2024-03-19 DIAGNOSIS — R07.2 PRECORDIAL PAIN: ICD-10-CM

## 2024-03-19 DIAGNOSIS — E78.01 FAMILIAL HYPERCHOLESTEROLEMIA: ICD-10-CM

## 2024-03-19 DIAGNOSIS — Z72.0 TOBACCO USE: ICD-10-CM

## 2024-03-19 DIAGNOSIS — I10 BENIGN ESSENTIAL HTN: Primary | ICD-10-CM

## 2024-03-19 PROBLEM — Z96.652 HISTORY OF LEFT KNEE REPLACEMENT: Status: RESOLVED | Noted: 2022-12-06 | Resolved: 2024-03-19

## 2024-03-19 PROCEDURE — 99213 OFFICE O/P EST LOW 20 MIN: CPT | Performed by: INTERNAL MEDICINE

## 2024-03-19 PROCEDURE — 93000 ELECTROCARDIOGRAM COMPLETE: CPT | Performed by: INTERNAL MEDICINE

## 2024-03-20 NOTE — PROGRESS NOTES
[facility]  [facility]  [address] [place] [address]  Dept: [phone]  Dept Fax: [phone]    Betty Ann Hume  1965    Follow Up Office Visit Note    History of Present Illness:  Betty Ann Hume is a 59 y.o. female who presents to the clinic for Follow-up.Hypertension - The BP is fine on Ramipril 2,5 daily she is still smoking BP is 120.70, has mild to moderate PVD but she is asymptomatic, no claudications, will keep ASA    The following portions of the patient's history were reviewed and updated as appropriate: allergies, current medications, past family history, past medical history, past social history, past surgical history, and problem list.    Medications:  aspirin  ciprofloxacin-dexAMETHasone  DULoxetine  ramipril  Wegovy solution auto-injector    Subjective  Allergies   Allergen Reactions    Erythromycin Anaphylaxis, Swelling and Unknown (See Comments)    Shellfish-Derived Products Anaphylaxis    Statins Other (See Comments)     Severe body aches     Latex Rash        Past Medical History:   Diagnosis Date    Abnormality of right breast on screening mammogram 11/12/2019    Anemia     ASCUS with positive high risk HPV cervical 07/29/2019    Cervical dysplasia     Diverticulitis     Headache     SINUS HEADACHE / MIGRAINE    HPV (human papilloma virus) infection     Hypercholesterolemia     Hypertension     IBS (irritable bowel syndrome)     Screening for breast cancer 05/12/2022       Past Surgical History:   Procedure Laterality Date    AUGMENTATION MAMMAPLASTY      BREAST AUGMENTATION  2004    DOMINIQUE LATERAL    KNEE ARTHROSCOPY Right 05/20/2019    turned into much more complicated    KNEE SURGERY Right 2008    KNEE SURGERY Left     TOTAL KNEE ARTHROPLASTY Right 05/2020    TUBAL ABDOMINAL LIGATION  2003       Family History   Problem Relation Age of Onset    Osteoporosis Mother     Thyroid disease Mother     Diabetes Mother     Heart disease Mother      "Hypertension Mother     Stroke Mother     Asthma Mother     Thyroid disease Father     Diabetes Father     Heart disease Father     Hypertension Father     Osteoporosis Sister     Lupus Sister     Thyroid disease Sister     Heart disease Sister     Hypertension Sister     Colon cancer Maternal Grandmother         late 60's    Hodgkin's lymphoma Sister         NON    Skin cancer Brother     Breast cancer Neg Hx     Ovarian cancer Neg Hx     Uterine cancer Neg Hx         Social History     Socioeconomic History    Marital status:     Number of children: 2   Tobacco Use    Smoking status: Every Day     Current packs/day: 0.50     Average packs/day: 0.5 packs/day for 25.0 years (12.5 ttl pk-yrs)     Types: Cigarettes    Smokeless tobacco: Never   Vaping Use    Vaping status: Never Used   Substance and Sexual Activity    Alcohol use: Yes     Comment: OCC    Drug use: No    Sexual activity: Yes     Partners: Male     Birth control/protection: Post-menopausal     Comment: 1x per month       Review of Systems   Constitutional: Negative.    HENT: Negative.     Respiratory: Negative.     Cardiovascular: Negative.    Endocrine: Negative.    Genitourinary: Negative.    Musculoskeletal: Negative.    Skin: Negative.    Allergic/Immunologic: Negative.    Neurological: Negative.    Hematological: Negative.    Psychiatric/Behavioral: Negative.         Cardiovascular Procedures    ECHO/MUGA:  STRESS TESTS:   CARDIAC CATH:   DEVICES:   HOLTER:   CT/MRI:   VASCULAR:   CARDIOTHORACIC:     Objective  Vitals:    03/19/24 1546   BP: 130/70   BP Location: Right arm   Patient Position: Lying   Cuff Size: Adult   Pulse: 86   Resp: 18   SpO2: 99%   Weight: 65.8 kg (145 lb)   Height: 154.9 cm (61\")   PainSc: 0-No pain     Body mass index is 27.4 kg/m².     Physical Exam  Vitals reviewed.   Constitutional:       Appearance: Healthy appearance. Not in distress.   Neck:      Vascular: No JVR. JVD normal.   Pulmonary:      Effort: Pulmonary " effort is normal.      Breath sounds: Normal breath sounds. No wheezing. No rhonchi. No rales.   Chest:      Chest wall: Not tender to palpatation.   Cardiovascular:      PMI at left midclavicular line. Normal rate. Regular rhythm. Normal S1. Normal S2.       Murmurs: There is no murmur.      No gallop.  No click. No rub.   Pulses:     Intact distal pulses.   Edema:     Peripheral edema absent.   Abdominal:      General: Bowel sounds are normal.      Palpations: Abdomen is soft.      Tenderness: There is no abdominal tenderness.   Musculoskeletal: Normal range of motion.         General: No tenderness. Skin:     General: Skin is warm and dry.   Neurological:      General: No focal deficit present.      Mental Status: Alert and oriented to person, place and time.        Diagnostic Data    ECG 12 Lead    Date/Time: 3/19/2024 9:37 PM  Performed by: Bryn Haile MD    Authorized by: Bryn Haile MD  Comparison: compared with previous ECG from 9/20/2023  Similar to previous ECG  Rhythm: sinus rhythm  Rate: normal  BPM: 67  QRS axis: normal    Clinical impression: normal ECG        Assessment and Plan  Diagnoses and all orders for this visit:    Benign essential HTN-BP is fine 120.70 on low dose Ramipril 2,5 mg daily    Precordial pain- No longer has any chest pain    Familial hypercholesterolemia- She is not taking Repatha any more her insurance did not approved it but now has a new insurance, will get a new lipid profile, she was not able to tolerate statins    Tobacco use- Still smoking, therefore high risk for cardiovascular events    PVD (peripheral vascular disease) with claudication- No major claudication, on ASA 81 mg         Return in about 1 year (around 3/19/2025) for Recheck with Dr. Haile.    Bryn Haile MD  03/19/2024

## 2024-03-21 ENCOUNTER — TELEPHONE (OUTPATIENT)
Dept: FAMILY MEDICINE CLINIC | Facility: CLINIC | Age: 59
End: 2024-03-21

## 2024-03-21 NOTE — TELEPHONE ENCOUNTER
Caller: Hume, Betty Ann    Relationship: Self    Best call back number: [phone]     What is the best time to reach you: ANY    Who are you requesting to speak with (clinical staff, provider,  specific staff member): CHEPE BO    What was the call regarding: THIS SATURDAY IS HER 4TH DOSE OF THE WEGOVY. THE MEDICATION IS DOING WELL. SHE FEELS MORE ENERGY. IT IS CURBING HER APPETITE. THERE HAVE NOT REALLY BEEN ANY PROBLEMS OTHER THAN SLIGHT BUT QUICK BOUTS OF NAUSEA. THE PATIENT WAS TOLD TO CHECK IN TO LET HIM KNOW HOW THE MEDICATION IS WORKING. SHE WAS NOT SURE IF FOR IT TO BE FILLED AGAIN SHE NEEDED TO BE SEEN OR IF HE JUST WANTED HER TO LEAVE A MESSAGE. THE PATIENT DID SCHEDULE AN APPOINTMENT FOR 03.26.24 AT 1:30 PM JUST INCASE. PLEASE LET HER KNOW IF SHE NEEDS TO KEEP IT OR NOT.    [facility] [address] [place] [address] [phone] [facility] [phone] FX     Is it okay if the provider responds through [employer]: NO

## 2024-03-26 ENCOUNTER — OFFICE VISIT (OUTPATIENT)
Dept: FAMILY MEDICINE CLINIC | Facility: CLINIC | Age: 59
End: 2024-03-26
Payer: COMMERCIAL

## 2024-03-26 ENCOUNTER — CLINICAL SUPPORT (OUTPATIENT)
Dept: CARDIOLOGY | Facility: CLINIC | Age: 59
End: 2024-03-26
Payer: COMMERCIAL

## 2024-03-26 VITALS
HEART RATE: 68 BPM | WEIGHT: 142 LBS | SYSTOLIC BLOOD PRESSURE: 104 MMHG | DIASTOLIC BLOOD PRESSURE: 66 MMHG | BODY MASS INDEX: 26.81 KG/M2 | HEIGHT: 61 IN | OXYGEN SATURATION: 97 %

## 2024-03-26 DIAGNOSIS — E78.01 FAMILIAL HYPERCHOLESTEROLEMIA: ICD-10-CM

## 2024-03-26 DIAGNOSIS — Z23 IMMUNIZATION DUE: ICD-10-CM

## 2024-03-26 DIAGNOSIS — Z12.31 ENCOUNTER FOR SCREENING MAMMOGRAM FOR MALIGNANT NEOPLASM OF BREAST: ICD-10-CM

## 2024-03-26 DIAGNOSIS — Z72.0 TOBACCO USE: ICD-10-CM

## 2024-03-26 DIAGNOSIS — I10 BENIGN ESSENTIAL HTN: Primary | ICD-10-CM

## 2024-03-26 DIAGNOSIS — I73.9 PVD (PERIPHERAL VASCULAR DISEASE) WITH CLAUDICATION: ICD-10-CM

## 2024-03-26 DIAGNOSIS — E66.3 OVERWEIGHT (BMI 25.0-29.9): Primary | ICD-10-CM

## 2024-03-26 PROCEDURE — 36415 COLL VENOUS BLD VENIPUNCTURE: CPT | Performed by: INTERNAL MEDICINE

## 2024-03-26 RX ORDER — SEMAGLUTIDE 0.5 MG/.5ML
0.5 INJECTION, SOLUTION SUBCUTANEOUS WEEKLY
Qty: 2 ML | Refills: 0 | Status: SHIPPED | OUTPATIENT
Start: 2024-03-26

## 2024-03-26 NOTE — PROGRESS NOTES
Venipuncture Blood Specimen Collection  [facility] Just[facility] Guerra MA with good hemostasis. Patient tolerated the procedure well without complications.   03/26/24   Justina Guerra MA

## 2024-03-26 NOTE — ASSESSMENT & PLAN NOTE
Will increase Wegovy.  Again, patient states no personal or family history of thyroid cancer.  Risk of meds discussed and understood.  Proper diet and exercise plan discussed and encouraged.  Education provided.  Return to clinic or ED with any issues or concerns.

## 2024-03-26 NOTE — PROGRESS NOTES
"Chief Complaint  weight management    Subjective          Betty Ann Hume presents to [facility]  History of Present Illness    Presents for refill of Wegovy.  States has been on 0.25 mg for 1 month and doing well.  States she is down somewhere around 5 pounds.  Doing well on medication with no issues or side effects.  No personal or family history of thyroid cancer.  Wants to increase the dosage.  She states she feels in starting this medication she is also sleeping better.    Would like to get a Tdap vaccine today.  States she plans on discussing shingles vaccine with her pharmacist.    She is due a mammogram.    States she has never smoked over half a pack per day.  Denies pneumonia vaccines.    Objective   Vital Signs:   /66   Pulse 68   Ht 154.9 cm (61\")   Wt 64.4 kg (142 lb)   SpO2 97%   BMI 26.83 kg/m²     Body mass index is 26.83 kg/m².    Review of Systems   Constitutional:  Negative for chills, fatigue and fever.   HENT:  Negative for congestion.    Eyes:  Negative for visual disturbance.   Respiratory:  Negative for cough, shortness of breath and wheezing.    Cardiovascular: Negative.    Gastrointestinal:  Negative for abdominal pain, diarrhea, nausea and vomiting.   Genitourinary:  Negative for decreased urine volume, dysuria, frequency, hematuria and urgency.   Musculoskeletal:  Negative for back pain.   Neurological:  Negative for headache.       Past History:  Medical History: has a past medical history of Abnormality of right breast on screening mammogram (11/12/2019), Anemia, ASCUS with positive high risk HPV cervical (07/29/2019), Cervical dysplasia, Diverticulitis, Headache, HPV (human papilloma virus) infection, Hypercholesterolemia, Hypertension, IBS (irritable bowel syndrome), and Screening for breast cancer (05/12/2022).   Surgical History: has a past surgical history that includes Knee surgery (Right, 2008); Tubal ligation (2003); Breast Augmentation " (2004); Knee arthroscopy (Right, 05/20/2019); Augmentation mammaplasty; Total knee arthroplasty (Right, 05/2020); and Knee surgery (Left).   Family History: family history includes Asthma in her mother; Colon cancer in her maternal grandmother; Diabetes in her father and mother; Heart disease in her father, mother, and sister; Hodgkin's lymphoma in her sister; Hypertension in her father, mother, and sister; Lupus in her sister; Osteoporosis in her mother and sister; Skin cancer in her brother; Stroke in her mother; Thyroid disease in her father, mother, and sister.   Social History: reports that she has been smoking cigarettes. She has a 12.5 pack-year smoking history. She has never used smokeless tobacco. She reports current alcohol use. She reports that she does not use drugs.    PHQ-2 Depression Screening  Little interest or pleasure in doing things? 0-->not at all   Feeling down, depressed, or hopeless? 0-->not at all   PHQ-2 Total Score 0        PHQ-9 Depression Screening  Little interest or pleasure in doing things? 0-->not at all   Feeling down, depressed, or hopeless? 0-->not at all   Trouble falling or staying asleep, or sleeping too much?     Feeling tired or having little energy?     Poor appetite or overeating?     Feeling bad about yourself - or that you are a failure or have let yourself or your family down?     Trouble concentrating on things, such as reading the newspaper or watching television?     Moving or speaking so slowly that other people could have noticed? Or the opposite - being so fidgety or restless that you have been moving around a lot more than usual?     Thoughts that you would be better off dead, or of hurting yourself in some way?     PHQ-9 Total Score 0   If you checked off any problems, how difficult have these problems made it for you to do your work, take care of things at home, or get along with other people?       PHQ-9 Total Score: 0      Patient screened positive for  depression based on a PHQ-9 score of 0 on 3/26/2024. Follow-up recommendations include:       Current Outpatient Medications:     aspirin (aspirin) 81 MG EC tablet, Take 1 tablet by mouth Daily., Disp: 30 tablet, Rfl: 0    DULoxetine (CYMBALTA) 20 MG capsule, Take 1 capsule by mouth 2 (Two) Times a Day., Disp: 60 capsule, Rfl: 0    ramipril (Altace) 2.5 MG capsule, Take 1 capsule by mouth Daily., Disp: 90 capsule, Rfl: 3    Semaglutide-Weight Management (Wegovy) 0.5 MG/0.5ML solution auto-injector, Inject 0.5 mL under the skin into the appropriate area as directed 1 (One) Time Per Week. Call for next dose., Disp: 2 mL, Rfl: 0   (Not in a hospital admission)     Allergies: Erythromycin, Shellfish-derived products, Statins, and Latex    Physical Exam  Constitutional:       Appearance: Normal appearance.   Cardiovascular:      Rate and Rhythm: Normal rate and regular rhythm.      Heart sounds: Normal heart sounds.   Pulmonary:      Effort: Pulmonary effort is normal.      Breath sounds: Normal breath sounds.   Neurological:      General: No focal deficit present.      Mental Status: She is alert and oriented to person, place, and time. Mental status is at baseline.   Psychiatric:         Mood and Affect: Mood normal.         Behavior: Behavior normal.         Thought Content: Thought content normal.         Judgment: Judgment normal.          Result Review :                   Assessment and Plan    Diagnoses and all orders for this visit:    1. Overweight (BMI 25.0-29.9) (Primary)  Assessment & Plan:  Will increase Wegovy.  Again, patient states no personal or family history of thyroid cancer.  Risk of meds discussed and understood.  Proper diet and exercise plan discussed and encouraged.  Education provided.  Return to clinic or ED with any issues or concerns.    Orders:  -     Semaglutide-Weight Management (Wegovy) 0.5 MG/0.5ML solution auto-injector; Inject 0.5 mL under the skin into the appropriate area as directed  1 (One) Time Per Week. Call for next dose.  Dispense: 2 mL; Refill: 0    2. Encounter for screening mammogram for malignant neoplasm of breast  Assessment & Plan:  Will schedule mammogram.    Orders:  -     Mammo Screening Digital Tomosynthesis Bilateral With CAD; Future    3. Immunization due  Assessment & Plan:  Tdap vaccine given today in clinic.  Vaccine information sheet given.  Risk discussed and understood.  Patient tolerated well.  She states she will discuss shingles vaccine with her pharmacist.  Denies COVID flu and pneumonia vaccines and understands the risks.  Education provided.  Return to clinic or ED with any issues or concerns.    Orders:  -     Tdap Vaccine => 8yo IM (BOOSTRIX); Future  -     Tdap Vaccine => 8yo IM (BOOSTRIX)      Informed patient that she is due to have her lipids hep C and vitamin D levels checked.  States she will do that her next appointment.                    Follow Up   Return in about 3 months (around 6/26/2024).  Patient was given instructions and counseling regarding her condition or for health maintenance advice. Please see specific information pulled into the AVS if appropriate.     LARY Villatoro

## 2024-03-26 NOTE — ASSESSMENT & PLAN NOTE
Tdap vaccine given today in clinic.  Vaccine information sheet given.  Risk discussed and understood.  Patient tolerated well.  She states she will discuss shingles vaccine with her pharmacist.  Denies COVID flu and pneumonia vaccines and understands the risks.  Education provided.  Return to clinic or ED with any issues or concerns.

## 2024-03-28 LAB
ALBUMIN SERPL-MCNC: 4.6 G/DL (ref 3.8–4.9)
ALBUMIN/GLOB SERPL: 2.1 {RATIO} (ref 1.2–2.2)
ALP SERPL-CCNC: 93 IU/L (ref 44–121)
ALT SERPL-CCNC: 13 IU/L (ref 0–32)
APO B SERPL-MCNC: 120 MG/DL
AST SERPL-CCNC: 16 IU/L (ref 0–40)
BILIRUB SERPL-MCNC: 0.3 MG/DL (ref 0–1.2)
BUN SERPL-MCNC: 12 MG/DL (ref 6–24)
BUN/CREAT SERPL: 17 (ref 9–23)
CALCIUM SERPL-MCNC: 9.6 MG/DL (ref 8.7–10.2)
CHLORIDE SERPL-SCNC: 105 MMOL/L (ref 96–106)
CHOLEST SERPL-MCNC: 219 MG/DL (ref 100–199)
CK SERPL-CCNC: 72 U/L (ref 32–182)
CO2 SERPL-SCNC: 22 MMOL/L (ref 20–29)
CREAT SERPL-MCNC: 0.71 MG/DL (ref 0.57–1)
EGFRCR SERPLBLD CKD-EPI 2021: 98 ML/MIN/1.73
GLOBULIN SER CALC-MCNC: 2.2 G/DL (ref 1.5–4.5)
GLUCOSE SERPL-MCNC: 78 MG/DL (ref 70–99)
HDLC SERPL-MCNC: 36 MG/DL
LDLC SERPL CALC-MCNC: 156 MG/DL (ref 0–99)
POTASSIUM SERPL-SCNC: 3.9 MMOL/L (ref 3.5–5.2)
PROT SERPL-MCNC: 6.8 G/DL (ref 6–8.5)
SODIUM SERPL-SCNC: 140 MMOL/L (ref 134–144)
TRIGL SERPL-MCNC: 146 MG/DL (ref 0–149)
VLDLC SERPL CALC-MCNC: 27 MG/DL (ref 5–40)

## 2024-03-29 ENCOUNTER — TELEPHONE (OUTPATIENT)
Dept: CARDIOLOGY | Facility: CLINIC | Age: 59
End: 2024-03-29
Payer: COMMERCIAL

## 2024-03-29 NOTE — TELEPHONE ENCOUNTER
Ph call to pt no answer left detailed message to return call if agreeable to Repatha and with any questions.      ----- Message from Bryn Haile MD sent at 3/29/2024  4:30 PM EDT -----  Lipids are high so she needs to restart the repatha, the Apo B is also elevated, so she is at high risk , liver, kidney are good

## 2024-04-01 ENCOUNTER — TELEPHONE (OUTPATIENT)
Dept: CARDIOLOGY | Facility: CLINIC | Age: 59
End: 2024-04-01
Payer: COMMERCIAL

## 2024-04-01 NOTE — TELEPHONE ENCOUNTER
Phone call to pt with verbal understanding and agreeable to restart Rep[facility] sent to [facility].        ----- Message from Bryn Haile MD sent at 3/29/2024  4:30 PM EDT -----  Lipids are high so she needs to restart the repatha, the Apo B is also elevated, so she is at high risk , liver, kidney are good

## 2024-04-01 NOTE — TELEPHONE ENCOUNTER
Phone call to pt left another detailed message to return call regarding restarting Repatha and labs results.      ----- Message from Bryn Haile MD sent at 3/29/2024  4:30 PM EDT -----  Lipids are high so she needs to restart the repatha, the Apo B is also elevated, so she is at high risk , liver, kidney are good

## 2024-04-03 DIAGNOSIS — F41.9 ANXIETY AND DEPRESSION: ICD-10-CM

## 2024-04-03 DIAGNOSIS — F32.A ANXIETY AND DEPRESSION: ICD-10-CM

## 2024-04-04 NOTE — TELEPHONE ENCOUNTER
Caller: Hume, Betty Ann    Relationship: Self    Best call back number: [phone]    Requested Prescriptions:   Requested Prescriptions     Pending Prescriptions Disp Refills    Evolocumab (REPATHA) solution auto-injector SureClick injection 9 mL 2     Sig: Inject 1 mL under the skin into the appropriate area as directed Every 14 (Fourteen) Days.        Pharmacy where request should be sent: [facility] [address] [place] [address] [phone] [place] [phone] FX     Last office visit with prescribing clinician: 3/19/2024   Last telemedicine visit with prescribing clinician: Visit date not found   Next office visit with prescribing clinician: 3/19/2025     Additional details provided by patient: [facility] TOLD PT THAT, THAT IS ONE MEDICATION THEY DID NOT HAVE IN STOCK TO SUPPLY. WILL BE SENDING THIS TO THE [facility] FOR HER.     Does the patient have less than a 3 day supply:  [x] Yes  [] No    Would you like a call back once the refill request has been completed: [] Yes [x] No    If the office needs to give you a call back, can they leave a voicemail: [] Yes [x] No    Abdiaziz Tomlinson Rep   04/04/24 13:07 EDT

## 2024-04-09 RX ORDER — DULOXETIN HYDROCHLORIDE 20 MG/1
20 CAPSULE, DELAYED RELEASE ORAL 2 TIMES DAILY
Qty: 60 CAPSULE | Refills: 0 | Status: SHIPPED | OUTPATIENT
Start: 2024-04-09

## 2024-04-10 ENCOUNTER — TELEPHONE (OUTPATIENT)
Dept: CARDIOLOGY | Facility: CLINIC | Age: 59
End: 2024-04-10
Payer: COMMERCIAL

## 2024-05-16 ENCOUNTER — TELEPHONE (OUTPATIENT)
Dept: FAMILY MEDICINE CLINIC | Facility: CLINIC | Age: 59
End: 2024-05-16
Payer: COMMERCIAL

## 2024-05-16 NOTE — TELEPHONE ENCOUNTER
PT is experiencing lower back pain. I was trying to get her scheduled for a same day appt. However she needed later than 4pm today, which isn't an option. And she needed after 12pm tomorrow. But we will be closed. She advised she would try again next week.

## 2024-06-24 ENCOUNTER — OFFICE VISIT (OUTPATIENT)
Dept: FAMILY MEDICINE CLINIC | Facility: CLINIC | Age: 59
End: 2024-06-24
Payer: COMMERCIAL

## 2024-06-24 VITALS
SYSTOLIC BLOOD PRESSURE: 120 MMHG | HEIGHT: 61 IN | HEART RATE: 63 BPM | WEIGHT: 139 LBS | DIASTOLIC BLOOD PRESSURE: 82 MMHG | BODY MASS INDEX: 26.24 KG/M2 | OXYGEN SATURATION: 100 %

## 2024-06-24 DIAGNOSIS — E66.3 OVERWEIGHT (BMI 25.0-29.9): ICD-10-CM

## 2024-06-24 DIAGNOSIS — K21.9 GASTROESOPHAGEAL REFLUX DISEASE, UNSPECIFIED WHETHER ESOPHAGITIS PRESENT: Primary | ICD-10-CM

## 2024-06-24 PROCEDURE — 99214 OFFICE O/P EST MOD 30 MIN: CPT | Performed by: NURSE PRACTITIONER

## 2024-06-24 RX ORDER — SEMAGLUTIDE 1 MG/.5ML
1 INJECTION, SOLUTION SUBCUTANEOUS WEEKLY
Qty: 2 ML | Refills: 1 | Status: SHIPPED | OUTPATIENT
Start: 2024-06-24

## 2024-06-24 RX ORDER — OMEPRAZOLE 40 MG/1
40 CAPSULE, DELAYED RELEASE ORAL DAILY
Qty: 30 CAPSULE | Refills: 2 | Status: SHIPPED | OUTPATIENT
Start: 2024-06-24

## 2024-06-24 NOTE — PROGRESS NOTES
"Chief Complaint  weight management and Heartburn    Subjective          Betty Ann Hume presents to [facility]  Heartburn  She reports no abdominal pain, no chest pain, no coughing, no nausea or no wheezing. Pertinent negatives include no fatigue.       Patient states she is doing well on 0.5 mg of Wegovy.  No issues or side effects.  Would like to increase to the next dosage.  Tries to watch her diet she does stay active.  No personal or family history of thyroid cancer.    She also states she feels her heartburn is starting to flare back up so she wants to get started back on omeprazole of which she had good success with in the past.  Feels fine currently.  No dizziness no headache no chest pain no chest pressure no shortness of breath no trouble breathing no urinary or bowel issues.    Objective   Vital Signs:   /82   Pulse 63   Ht 154.9 cm (61\")   Wt 63 kg (139 lb)   SpO2 100%   BMI 26.26 kg/m²     Body mass index is 26.26 kg/m².    Review of Systems   Constitutional:  Negative for chills, fatigue and fever.   HENT:  Negative for congestion.    Eyes:  Negative for visual disturbance.   Respiratory:  Negative for cough, shortness of breath and wheezing.    Cardiovascular:  Negative for chest pain and palpitations.   Gastrointestinal:  Positive for GERD. Negative for abdominal pain, constipation, diarrhea, nausea and vomiting.   Genitourinary:  Negative for dysuria, frequency, hematuria and urgency.   Skin:  Negative for rash.   Neurological:  Negative for headache.       Past History:  Medical History: has a past medical history of Abnormality of right breast on screening mammogram (11/12/2019), Anemia, ASCUS with positive high risk HPV cervical (07/29/2019), Cervical dysplasia, Diverticulitis, Headache, HPV (human papilloma virus) infection, Hypercholesterolemia, Hypertension, IBS (irritable bowel syndrome), and Screening for breast cancer (05/12/2022).   Surgical " History: has a past surgical history that includes Knee surgery (Right, 2008); Tubal ligation (2003); Breast Augmentation (2004); Knee arthroscopy (Right, 05/20/2019); Augmentation mammaplasty; Total knee arthroplasty (Right, 05/2020); and Knee surgery (Left).   Family History: family history includes Asthma in her mother; Colon cancer in her maternal grandmother; Diabetes in her father and mother; Heart disease in her father, mother, and sister; Hodgkin's lymphoma in her sister; Hypertension in her father, mother, and sister; Lupus in her sister; Osteoporosis in her mother and sister; Skin cancer in her brother; Stroke in her mother; Thyroid disease in her father, mother, and sister.   Social History: reports that she has been smoking cigarettes. She has a 12.5 pack-year smoking history. She has never used smokeless tobacco. She reports current alcohol use. She reports that she does not use drugs.    PHQ-2 Depression Screening  Little interest or pleasure in doing things?     Feeling down, depressed, or hopeless?     PHQ-2 Total Score          PHQ-9 Depression Screening  Little interest or pleasure in doing things?     Feeling down, depressed, or hopeless?     Trouble falling or staying asleep, or sleeping too much?     Feeling tired or having little energy?     Poor appetite or overeating?     Feeling bad about yourself - or that you are a failure or have let yourself or your family down?     Trouble concentrating on things, such as reading the newspaper or watching television?     Moving or speaking so slowly that other people could have noticed? Or the opposite - being so fidgety or restless that you have been moving around a lot more than usual?     Thoughts that you would be better off dead, or of hurting yourself in some way?     PHQ-9 Total Score     If you checked off any problems, how difficult have these problems made it for you to do your work, take care of things at home, or get along with other people?        PHQ-9 Total Score:        Patient screened positive for depression based on a PHQ-9 score of 0 on 3/26/2024. Follow-up recommendations include:          Current Outpatient Medications:     aspirin (aspirin) 81 MG EC tablet, Take 1 tablet by mouth Daily., Disp: 30 tablet, Rfl: 0    DULoxetine (CYMBALTA) 20 MG capsule, TAKE 1 CAPSULE TWICE A DAY, Disp: 60 capsule, Rfl: 0    Evolocumab (REPATHA) solution auto-injector SureClick injection, Inject 1 mL under the skin into the appropriate area as directed Every 14 (Fourteen) Days., Disp: 9 mL, Rfl: 2    ramipril (Altace) 2.5 MG capsule, Take 1 capsule by mouth Daily., Disp: 90 capsule, Rfl: 3    omeprazole (priLOSEC) 40 MG capsule, Take 1 capsule by mouth Daily., Disp: 30 capsule, Rfl: 2    Semaglutide-Weight Management (Wegovy) 1 MG/0.5ML solution auto-injector, Inject 0.5 mL under the skin into the appropriate area as directed 1 (One) Time Per Week., Disp: 2 mL, Rfl: 1   (Not in a hospital admission)     Allergies: Erythromycin, Shellfish-derived products, Statins, and Latex    Physical Exam  Constitutional:       Appearance: Normal appearance.   Cardiovascular:      Rate and Rhythm: Normal rate and regular rhythm.      Heart sounds: Normal heart sounds.   Pulmonary:      Effort: Pulmonary effort is normal.      Breath sounds: Normal breath sounds.   Abdominal:      General: Abdomen is flat. Bowel sounds are normal. There is no distension.      Palpations: Abdomen is soft.      Tenderness: There is no abdominal tenderness. There is no guarding or rebound.   Neurological:      General: No focal deficit present.      Mental Status: She is alert and oriented to person, place, and time. Mental status is at baseline.   Psychiatric:         Mood and Affect: Mood normal.         Behavior: Behavior normal.         Thought Content: Thought content normal.         Judgment: Judgment normal.          Result Review :                   Assessment and Plan    Diagnoses and all  orders for this visit:    1. Gastroesophageal reflux disease, unspecified whether esophagitis present (Primary)  -     omeprazole (priLOSEC) 40 MG capsule; Take 1 capsule by mouth Daily.  Dispense: 30 capsule; Refill: 2    2. Overweight (BMI 25.0-29.9)  -     Semaglutide-Weight Management (Wegovy) 1 MG/0.5ML solution auto-injector; Inject 0.5 mL under the skin into the appropriate area as directed 1 (One) Time Per Week.  Dispense: 2 mL; Refill: 1      We will increase the Wegovy.  Will start back on omeprazole.  Proper diet and exercise plan discussed and encouraged.  Risk of meds discussed and understood.  Education provided.  Patient states she will follow-up in 3 months for routine checkup and will do blood work at that appointment.  States will discuss all preventative measures at her next appointment in 3 months.  Patient states no personal or family history of thyroid cancer.  Return in 1 week if GERD not improving, sooner if worsens.  Return to clinic or ED with any issues or concerns.                    Follow Up   Return in about 3 months (around 9/24/2024) for Annual physical.  Patient was given instructions and counseling regarding her condition or for health maintenance advice. Please see specific information pulled into the AVS if appropriate.     LARY Villatoro

## 2024-07-15 RX ORDER — RAMIPRIL 2.5 MG/1
2.5 CAPSULE ORAL DAILY
Qty: 90 CAPSULE | Refills: 3 | Status: SHIPPED | OUTPATIENT
Start: 2024-07-15

## 2024-07-15 NOTE — TELEPHONE ENCOUNTER
Caller: Hume, Betty Ann    Relationship: Self    Best call back number:[phone]    Requested Prescriptions:   Requested Prescriptions     Pending Prescriptions Disp Refills    ramipril (Altace) 2.5 MG capsule 90 capsule 3     Sig: Take 1 capsule by mouth Daily.        Pharmacy where request should be sent: [facility] [address] [place] [address] [phone] [facility] [phone] FX     Last office visit with prescribing clinician: 3/19/2024   Last telemedicine visit with prescribing clinician: Visit date not found   Next office visit with prescribing clinician: 3/19/2025       Does the patient have less than a 3 day supply:  [x] Yes  [] No    Would you like a call back once the refill request has been completed: [] Yes [x] No    If the office needs to give you a call back, can they leave a voicemail: [] Yes [x] No    Abdiaziz Youngblood Rep   07/15/24 13:31 EDT

## 2024-09-09 DIAGNOSIS — K21.9 GASTROESOPHAGEAL REFLUX DISEASE, UNSPECIFIED WHETHER ESOPHAGITIS PRESENT: ICD-10-CM

## 2024-09-09 RX ORDER — OMEPRAZOLE 40 MG/1
40 CAPSULE, DELAYED RELEASE ORAL DAILY
Qty: 30 CAPSULE | Refills: 0 | Status: SHIPPED | OUTPATIENT
Start: 2024-09-09

## 2024-09-18 ENCOUNTER — OFFICE VISIT (OUTPATIENT)
Dept: FAMILY MEDICINE CLINIC | Facility: CLINIC | Age: 59
End: 2024-09-18
Payer: COMMERCIAL

## 2024-09-18 VITALS
SYSTOLIC BLOOD PRESSURE: 134 MMHG | OXYGEN SATURATION: 97 % | DIASTOLIC BLOOD PRESSURE: 88 MMHG | HEIGHT: 61 IN | WEIGHT: 142 LBS | HEART RATE: 74 BPM | BODY MASS INDEX: 26.81 KG/M2

## 2024-09-18 DIAGNOSIS — R30.0 BURNING WITH URINATION: Primary | ICD-10-CM

## 2024-09-18 DIAGNOSIS — R10.9 FLANK PAIN: ICD-10-CM

## 2024-09-18 LAB
BILIRUB BLD-MCNC: NEGATIVE MG/DL
CLARITY, POC: CLEAR
COLOR UR: YELLOW
EXPIRATION DATE: ABNORMAL
GLUCOSE UR STRIP-MCNC: NEGATIVE MG/DL
KETONES UR QL: NEGATIVE
LEUKOCYTE EST, POC: NEGATIVE
Lab: ABNORMAL
NITRITE UR-MCNC: NEGATIVE MG/ML
PH UR: 5.5 [PH] (ref 5–8)
PROT UR STRIP-MCNC: NEGATIVE MG/DL
RBC # UR STRIP: ABNORMAL /UL
SP GR UR: 1.03 (ref 1–1.03)
UROBILINOGEN UR QL: ABNORMAL

## 2024-09-18 PROCEDURE — 81003 URINALYSIS AUTO W/O SCOPE: CPT | Performed by: NURSE PRACTITIONER

## 2024-09-18 PROCEDURE — 99214 OFFICE O/P EST MOD 30 MIN: CPT | Performed by: NURSE PRACTITIONER

## 2024-09-18 RX ORDER — CIPROFLOXACIN 500 MG/1
500 TABLET, FILM COATED ORAL 2 TIMES DAILY
Qty: 20 TABLET | Refills: 0 | Status: SHIPPED | OUTPATIENT
Start: 2024-09-18

## 2024-09-20 LAB
BACTERIA UR CULT: NORMAL
BACTERIA UR CULT: NORMAL

## 2024-10-09 ENCOUNTER — TELEPHONE (OUTPATIENT)
Dept: FAMILY MEDICINE CLINIC | Facility: CLINIC | Age: 59
End: 2024-10-09
Payer: COMMERCIAL

## 2024-10-09 NOTE — TELEPHONE ENCOUNTER
Hub relay: left message for patient to return call to reschedule the lab appointment they missed recently please transfer to [place]

## 2024-11-18 DIAGNOSIS — F32.A ANXIETY AND DEPRESSION: ICD-10-CM

## 2024-11-18 DIAGNOSIS — K21.9 GASTROESOPHAGEAL REFLUX DISEASE, UNSPECIFIED WHETHER ESOPHAGITIS PRESENT: ICD-10-CM

## 2024-11-18 DIAGNOSIS — F41.9 ANXIETY AND DEPRESSION: ICD-10-CM

## 2024-11-18 RX ORDER — DULOXETIN HYDROCHLORIDE 20 MG/1
20 CAPSULE, DELAYED RELEASE ORAL 2 TIMES DAILY
Qty: 60 CAPSULE | Refills: 0 | Status: SHIPPED | OUTPATIENT
Start: 2024-11-18 | End: 2024-11-25 | Stop reason: SDUPTHER

## 2024-11-18 RX ORDER — OMEPRAZOLE 40 MG/1
40 CAPSULE, DELAYED RELEASE ORAL DAILY
Qty: 30 CAPSULE | Refills: 0 | Status: SHIPPED | OUTPATIENT
Start: 2024-11-18 | End: 2024-11-25 | Stop reason: SDUPTHER

## 2024-11-18 NOTE — TELEPHONE ENCOUNTER
Caller: Hume, Betty Ann    Relationship: Self    Best call back number:  [phone]     Requested Prescriptions:   Requested Prescriptions     Pending Prescriptions Disp Refills    DULoxetine (CYMBALTA) 20 MG capsule 60 capsule 0     Sig: Take 1 capsule by mouth 2 (Two) Times a Day.        Pharmacy where request should be sent: [facility] [address] [place] [address] [phone] [facility] [phone] FX     Last office visit with prescribing clinician: 6/24/2024   Last telemedicine visit with prescribing clinician: Visit date not found   Next office visit with prescribing clinician: 11/25/2024     Additional details provided by patient: COMPLETELY  OUT    Does the patient have less than a 3 day supply:  [x] Yes  [] No    Would you like a call back once the refill request has been completed: [] Yes [x] No    If the office needs to give you a call back, can they leave a voicemail: [] Yes [x] No    Abdiaziz Salmeron Rep   11/18/24 10:14 EST

## 2024-11-25 ENCOUNTER — TELEPHONE (OUTPATIENT)
Dept: FAMILY MEDICINE CLINIC | Facility: CLINIC | Age: 59
End: 2024-11-25

## 2024-11-25 ENCOUNTER — OFFICE VISIT (OUTPATIENT)
Dept: FAMILY MEDICINE CLINIC | Facility: CLINIC | Age: 59
End: 2024-11-25
Payer: COMMERCIAL

## 2024-11-25 VITALS
HEART RATE: 71 BPM | WEIGHT: 150.44 LBS | HEIGHT: 61 IN | DIASTOLIC BLOOD PRESSURE: 88 MMHG | OXYGEN SATURATION: 97 % | BODY MASS INDEX: 28.4 KG/M2 | SYSTOLIC BLOOD PRESSURE: 138 MMHG

## 2024-11-25 DIAGNOSIS — G89.29 CHRONIC PAIN OF LEFT KNEE: ICD-10-CM

## 2024-11-25 DIAGNOSIS — F41.9 ANXIETY AND DEPRESSION: ICD-10-CM

## 2024-11-25 DIAGNOSIS — M25.50 ARTHRALGIA, UNSPECIFIED JOINT: ICD-10-CM

## 2024-11-25 DIAGNOSIS — I10 BENIGN ESSENTIAL HTN: ICD-10-CM

## 2024-11-25 DIAGNOSIS — R87.610 ASCUS WITH POSITIVE HIGH RISK HPV CERVICAL: ICD-10-CM

## 2024-11-25 DIAGNOSIS — Z12.31 ENCOUNTER FOR SCREENING MAMMOGRAM FOR MALIGNANT NEOPLASM OF BREAST: ICD-10-CM

## 2024-11-25 DIAGNOSIS — E66.3 OVERWEIGHT (BMI 25.0-29.9): ICD-10-CM

## 2024-11-25 DIAGNOSIS — Z72.0 TOBACCO USE: ICD-10-CM

## 2024-11-25 DIAGNOSIS — Z00.00 ANNUAL PHYSICAL EXAM: Primary | ICD-10-CM

## 2024-11-25 DIAGNOSIS — N87.0 MILD DYSPLASIA OF CERVIX (CIN I): ICD-10-CM

## 2024-11-25 DIAGNOSIS — F32.A ANXIETY AND DEPRESSION: ICD-10-CM

## 2024-11-25 DIAGNOSIS — Z11.59 NEED FOR HEPATITIS C SCREENING TEST: ICD-10-CM

## 2024-11-25 DIAGNOSIS — I73.9 PVD (PERIPHERAL VASCULAR DISEASE) WITH CLAUDICATION: ICD-10-CM

## 2024-11-25 DIAGNOSIS — Z79.899 ENCOUNTER FOR LONG-TERM (CURRENT) USE OF MEDICATIONS: ICD-10-CM

## 2024-11-25 DIAGNOSIS — R53.83 OTHER FATIGUE: ICD-10-CM

## 2024-11-25 DIAGNOSIS — M25.562 CHRONIC PAIN OF LEFT KNEE: ICD-10-CM

## 2024-11-25 DIAGNOSIS — Z12.11 SCREENING FOR COLON CANCER: ICD-10-CM

## 2024-11-25 DIAGNOSIS — E78.01 FAMILIAL HYPERCHOLESTEROLEMIA: ICD-10-CM

## 2024-11-25 DIAGNOSIS — Z96.652 HISTORY OF TOTAL KNEE REPLACEMENT, LEFT: ICD-10-CM

## 2024-11-25 DIAGNOSIS — K21.9 GASTROESOPHAGEAL REFLUX DISEASE, UNSPECIFIED WHETHER ESOPHAGITIS PRESENT: ICD-10-CM

## 2024-11-25 DIAGNOSIS — R87.810 ASCUS WITH POSITIVE HIGH RISK HPV CERVICAL: ICD-10-CM

## 2024-11-25 PROBLEM — R07.9 CHEST PAIN: Status: RESOLVED | Noted: 2021-12-06 | Resolved: 2024-11-25

## 2024-11-25 PROBLEM — M79.672 LEFT FOOT PAIN: Status: RESOLVED | Noted: 2024-02-14 | Resolved: 2024-11-25

## 2024-11-25 LAB
BILIRUB BLD-MCNC: NEGATIVE MG/DL
CLARITY, POC: CLEAR
COLOR UR: YELLOW
EXPIRATION DATE: NORMAL
GLUCOSE UR STRIP-MCNC: NEGATIVE MG/DL
KETONES UR QL: NEGATIVE
LEUKOCYTE EST, POC: NEGATIVE
Lab: NORMAL
NITRITE UR-MCNC: NEGATIVE MG/ML
PH UR: 7 [PH] (ref 5–8)
PROT UR STRIP-MCNC: NEGATIVE MG/DL
RBC # UR STRIP: NEGATIVE /UL
SP GR UR: 1.01 (ref 1–1.03)
UROBILINOGEN UR QL: NORMAL

## 2024-11-25 PROCEDURE — 81003 URINALYSIS AUTO W/O SCOPE: CPT | Performed by: NURSE PRACTITIONER

## 2024-11-25 PROCEDURE — 99396 PREV VISIT EST AGE 40-64: CPT | Performed by: NURSE PRACTITIONER

## 2024-11-25 PROCEDURE — 99214 OFFICE O/P EST MOD 30 MIN: CPT | Performed by: NURSE PRACTITIONER

## 2024-11-25 RX ORDER — DULOXETIN HYDROCHLORIDE 20 MG/1
20 CAPSULE, DELAYED RELEASE ORAL 2 TIMES DAILY
Qty: 180 CAPSULE | Refills: 1 | Status: SHIPPED | OUTPATIENT
Start: 2024-11-25

## 2024-11-25 RX ORDER — ORAL SEMAGLUTIDE 3 MG/1
3 TABLET ORAL DAILY
Qty: 30 TABLET | Refills: 0 | Status: SHIPPED | OUTPATIENT
Start: 2024-11-25

## 2024-11-25 RX ORDER — OMEPRAZOLE 40 MG/1
40 CAPSULE, DELAYED RELEASE ORAL DAILY
Qty: 90 CAPSULE | Refills: 1 | Status: SHIPPED | OUTPATIENT
Start: 2024-11-25

## 2024-11-25 NOTE — PROGRESS NOTES
"Chief Complaint  weight management    Subjective          Betty Ann Hume presents to [facility]  History of Present Illness  History of Present Illness  The patient is a 59-year-old female who presents for evaluation of multiple medical concerns.    Continues to smoke.  States she is smoked about 20 years but is never smoked over half a pack a day.  She is due a mammogram.  States she keeps up with Pap smears with her gynecologist Dr. Yañez.  Colonoscopy up-to-date.  She denies all immunizations.    She has had trouble finding Wegovy but states she was doing well on it. she has returned to a weight of 150 pounds.  She was requesting to try semaglutide in pill form.  States no personal or family history of thyroid cancer.  States no risk or chance of pregnancy.  Her energy levels are consistently low, with no noticeable difference between morning and evening.  Tries to watch her diet she does stay active but continues to feel fatigued and rundown.    She also reports snoring but has not undergone a sleep study.    Her last colonoscopy was in 2022, with a recommendation for a repeat every 3 years.    She has not seen her cardiologist in the past 6 months. Dr. Haile is her cardiologist.    She uses glasses for vision correction.    She was due for blood work in 06/2024 but was unable to complete it.    SOCIAL HISTORY  She smokes less than half pack a day for 20 years.    IMMUNIZATIONS  She is up to date on her tetanus vaccine.    Objective   Vital Signs:   /88   Pulse 71   Ht 154.9 cm (61\")   Wt 68.2 kg (150 lb 7 oz)   SpO2 97%   BMI 28.42 kg/m²     Body mass index is 28.42 kg/m².    Review of Systems   Constitutional:  Positive for fatigue. Negative for chills, fever, unexpected weight gain and unexpected weight loss.   HENT: Negative.     Eyes: Negative.    Respiratory: Negative.     Cardiovascular: Negative.    Gastrointestinal: Negative.    Endocrine: Negative for " polydipsia, polyphagia and polyuria.   Genitourinary: Negative.    Musculoskeletal: Negative.    Skin: Negative.    Neurological: Negative.    Psychiatric/Behavioral: Negative.         Past History:  Medical History: has a past medical history of Abnormality of right breast on screening mammogram (11/12/2019), Anemia, ASCUS with positive high risk HPV cervical (07/29/2019), Cervical dysplasia, Diverticulitis, Headache, HPV (human papilloma virus) infection, Hypercholesterolemia, Hypertension, IBS (irritable bowel syndrome), and Screening for breast cancer (05/12/2022).   Surgical History: has a past surgical history that includes Knee surgery (Right, 2008); Tubal ligation (2003); Breast Augmentation (2004); Knee arthroscopy (Right, 05/20/2019); Augmentation mammaplasty; Total knee arthroplasty (Right, 05/2020); and Knee surgery (Left).   Family History: family history includes Asthma in her mother; Colon cancer in her maternal grandmother; Diabetes in her father and mother; Heart disease in her father, mother, and sister; Hodgkin's lymphoma in her sister; Hypertension in her father, mother, and sister; Lupus in her sister; Osteoporosis in her mother and sister; Skin cancer in her brother; Stroke in her mother; Thyroid disease in her father, mother, and sister.   Social History: reports that she has been smoking cigarettes. She has a 12.5 pack-year smoking history. She has never used smokeless tobacco. She reports current alcohol use. She reports that she does not use drugs.    PHQ-2 Depression Screening  Little interest or pleasure in doing things?     Feeling down, depressed, or hopeless?     PHQ-2 Total Score         PHQ-9 Depression Screening  Little interest or pleasure in doing things?     Feeling down, depressed, or hopeless?     PHQ-2 Total Score     Trouble falling or staying asleep, or sleeping too much?     Feeling tired or having little energy?     Poor appetite or overeating?     Feeling bad about  yourself - or that you are a failure or have let yourself or your family down?     Trouble concentrating on things, such as reading the newspaper or watching television?     Moving or speaking so slowly that other people could have noticed? Or the opposite - being so fidgety or restless that you have been moving around a lot more than usual?     Thoughts that you would be better off dead, or of hurting yourself in some way?     PHQ-9 Total Score     If you checked off any problems, how difficult have these problems made it for you to do your work, take care of things at home, or get along with other people?          PHQ-9 Total Score:        Patient screened positive for depression based on a PHQ-9 score of  on . Follow-up recommendations include:          Current Outpatient Medications:     aspirin (aspirin) 81 MG EC tablet, Take 1 tablet by mouth Daily., Disp: 30 tablet, Rfl: 0    DULoxetine (CYMBALTA) 20 MG capsule, Take 1 capsule by mouth 2 (Two) Times a Day., Disp: 180 capsule, Rfl: 1    omeprazole (priLOSEC) 40 MG capsule, Take 1 capsule by mouth Daily., Disp: 90 capsule, Rfl: 1    ramipril (Altace) 2.5 MG capsule, Take 1 capsule by mouth Daily., Disp: 90 capsule, Rfl: 3    Evolocumab (REPATHA) solution auto-injector SureClick injection, Inject 1 mL under the skin into the appropriate area as directed Every 14 (Fourteen) Days. (Patient not taking: Reported on 11/25/2024), Disp: 9 mL, Rfl: 2    Semaglutide (Rybelsus) 3 MG tablet, Take 1 tablet by mouth Daily. Call for next dose., Disp: 30 tablet, Rfl: 0   (Not in a hospital admission)     Allergies: Erythromycin, Shellfish-derived products, Statins, and Latex    Physical Exam  Constitutional:       Appearance: Normal appearance.   HENT:      Head: Normocephalic.      Right Ear: Tympanic membrane, ear canal and external ear normal.      Left Ear: Tympanic membrane, ear canal and external ear normal.      Nose: Nose normal.      Mouth/Throat:      Mouth: Mucous  membranes are moist.      Pharynx: Oropharynx is clear.   Eyes:      Extraocular Movements: Extraocular movements intact.      Conjunctiva/sclera: Conjunctivae normal.      Pupils: Pupils are equal, round, and reactive to light.   Cardiovascular:      Rate and Rhythm: Normal rate and regular rhythm.      Heart sounds: Normal heart sounds.   Pulmonary:      Effort: Pulmonary effort is normal.      Breath sounds: Normal breath sounds.   Abdominal:      General: Abdomen is flat. Bowel sounds are normal.      Palpations: Abdomen is soft.   Genitourinary:     Comments: Denied   Musculoskeletal:         General: Normal range of motion.      Cervical back: Normal range of motion.   Skin:     General: Skin is warm.   Neurological:      General: No focal deficit present.      Mental Status: She is alert and oriented to person, place, and time. Mental status is at baseline.   Psychiatric:         Mood and Affect: Mood normal.         Behavior: Behavior normal.         Thought Content: Thought content normal.         Judgment: Judgment normal.        Physical Exam      Result Review :          Results               Assessment and Plan    Diagnoses and all orders for this visit:    1. Annual physical exam (Primary)  -     CBC & Differential  -     Comprehensive Metabolic Panel  -     TSH Rfx On Abnormal To Free T4  -     POC Urinalysis Dipstick, Automated; Future    2. PVD (peripheral vascular disease) with claudication    3. Familial hypercholesterolemia  -     Lipid Panel    4. Benign essential HTN    5. Screening for colon cancer    6. Gastroesophageal reflux disease, unspecified whether esophagitis present  -     omeprazole (priLOSEC) 40 MG capsule; Take 1 capsule by mouth Daily.  Dispense: 90 capsule; Refill: 1    7. Encounter for screening mammogram for malignant neoplasm of breast  -     Mammo Screening Digital Tomosynthesis Bilateral With CAD; Future    8. Mild dysplasia of cervix (SAVITA I)    9. ASCUS with positive high  risk HPV cervical    10. Encounter for long-term (current) use of medications  -     Magnesium    11. Anxiety and depression  -     DULoxetine (CYMBALTA) 20 MG capsule; Take 1 capsule by mouth 2 (Two) Times a Day.  Dispense: 180 capsule; Refill: 1    12. History of total knee replacement, left    13. Chronic pain of left knee    14. Arthralgia, unspecified joint    15. Tobacco use    16. Overweight (BMI 25.0-29.9)  -     Semaglutide (Rybelsus) 3 MG tablet; Take 1 tablet by mouth Daily. Call for next dose.  Dispense: 30 tablet; Refill: 0    17. Other fatigue  -     CBC & Differential  -     Comprehensive Metabolic Panel  -     TSH Rfx On Abnormal To Free T4  -     Iron  -     Ferritin  -     Folate  -     Vitamin D,25-Hydroxy  -     Vitamin B12  -     POC Urinalysis Dipstick, Automated; Future  -     Home Sleep Study; Future    18. Need for hepatitis C screening test  -     Hepatitis C Antibody      Assessment & Plan  1. Weight management.  The patient has experienced weight gain after discontinuing her previous medication due to difficulty in getting it filled on time. She reports that the medication helped with weight loss and energy levels. A prescription for Rybelsus will be called in, starting with a low dose and increasing as needed. She is advised to check the price and inform the office if it is expensive so that a prior authorization can be run. If not covered, other options, including semaglutide in shot form, will be considered.  Proper diet and exercise plan discussed and encouraged.  Risk of meds discussed and understood.  Education provided.    2. Constipation.  She has a history of diverticulitis. She is advised to take Metamucil daily, stay well-hydrated, and use MiraLAX if she goes a couple of days without a bowel movement to prevent constipation.  Again, proper diet and exercise plan discussed and encouraged.    3. Fatigue.  She reports zero energy. A comprehensive fatigue panel will be conducted to  check all vitamin levels. A sleep study will be ordered to assess for sleep apnea, given her history of snoring. The sleep study will be sent to her home, and she is advised to call the office if it is not received within two weeks and after completing it to ensure results are obtained.    4. Hyperlipidemia.  Continue current medications.  She is advised to follow up with her cardiologist, Dr. Haile, for cholesterol management.  Risk discussed and understood.    5. Health Maintenance.  A mammogram will be ordered as she is due for one. She had a colonoscopy in 2022 and is advised to repeat it in 2025. She is also advised to check with her gynecologist, Dr. Yañez, for her Pap smear.  She denies all immunizations and understands the risks.  Smoking cessation discussed and encouraged.  She denies all immunizations including flu pneumonia shingles and COVID and understands the risks.  Goal blood pressure less than 140/90.  Let me know if gets to or above that.  Annual dental and eye exams encouraged.  Education provided.  Return to clinic or ED with any issues or concerns.              BMI is >= 25 and <30. (Overweight) The following options were offered after discussion;: exercise counseling/recommendations and nutrition counseling/recommendations       Follow Up   Return in about 3 months (around 2/25/2025), or if symptoms worsen or fail to improve.  Patient was given instructions and counseling regarding her condition or for health maintenance advice. Please see specific information pulled into the AVS if appropriate.     Patient or patient representative verbalized consent for the use of Ambient Listening during the visit with  LARY Villatoro for chart documentation. 11/25/2024  09:29 EST    LARY Villatoro

## 2024-11-26 LAB
25(OH)D3+25(OH)D2 SERPL-MCNC: 34.3 NG/ML (ref 30–100)
ALBUMIN SERPL-MCNC: 4.6 G/DL (ref 3.8–4.9)
ALP SERPL-CCNC: 101 IU/L (ref 44–121)
ALT SERPL-CCNC: 12 IU/L (ref 0–32)
AST SERPL-CCNC: 18 IU/L (ref 0–40)
BASOPHILS # BLD AUTO: 0.1 X10E3/UL (ref 0–0.2)
BASOPHILS NFR BLD AUTO: 2 %
BILIRUB SERPL-MCNC: 0.4 MG/DL (ref 0–1.2)
BUN SERPL-MCNC: 10 MG/DL (ref 6–24)
BUN/CREAT SERPL: 15 (ref 9–23)
CALCIUM SERPL-MCNC: 9.6 MG/DL (ref 8.7–10.2)
CHLORIDE SERPL-SCNC: 101 MMOL/L (ref 96–106)
CHOLEST SERPL-MCNC: 260 MG/DL (ref 100–199)
CO2 SERPL-SCNC: 25 MMOL/L (ref 20–29)
CREAT SERPL-MCNC: 0.65 MG/DL (ref 0.57–1)
EGFRCR SERPLBLD CKD-EPI 2021: 102 ML/MIN/1.73
EOSINOPHIL # BLD AUTO: 0.4 X10E3/UL (ref 0–0.4)
EOSINOPHIL NFR BLD AUTO: 4 %
ERYTHROCYTE [DISTWIDTH] IN BLOOD BY AUTOMATED COUNT: 13.5 % (ref 11.7–15.4)
FERRITIN SERPL-MCNC: 52 NG/ML (ref 15–150)
FOLATE SERPL-MCNC: 15.9 NG/ML
GLOBULIN SER CALC-MCNC: 2.5 G/DL (ref 1.5–4.5)
GLUCOSE SERPL-MCNC: 84 MG/DL (ref 70–99)
HCT VFR BLD AUTO: 49.4 % (ref 34–46.6)
HCV IGG SERPL QL IA: NON REACTIVE
HDLC SERPL-MCNC: 30 MG/DL
HGB BLD-MCNC: 15.2 G/DL (ref 11.1–15.9)
IMM GRANULOCYTES # BLD AUTO: 0 X10E3/UL (ref 0–0.1)
IMM GRANULOCYTES NFR BLD AUTO: 0 %
IRON SERPL-MCNC: 79 UG/DL (ref 27–159)
LDL CALC COMMENT:: ABNORMAL
LDLC SERPL CALC-MCNC: 191 MG/DL (ref 0–99)
LYMPHOCYTES # BLD AUTO: 2.1 X10E3/UL (ref 0.7–3.1)
LYMPHOCYTES NFR BLD AUTO: 23 %
MAGNESIUM SERPL-MCNC: 2.4 MG/DL (ref 1.6–2.3)
MCH RBC QN AUTO: 30.1 PG (ref 26.6–33)
MCHC RBC AUTO-ENTMCNC: 30.8 G/DL (ref 31.5–35.7)
MCV RBC AUTO: 98 FL (ref 79–97)
MONOCYTES # BLD AUTO: 0.8 X10E3/UL (ref 0.1–0.9)
MONOCYTES NFR BLD AUTO: 9 %
NEUTROPHILS # BLD AUTO: 5.4 X10E3/UL (ref 1.4–7)
NEUTROPHILS NFR BLD AUTO: 62 %
PLATELET # BLD AUTO: 392 X10E3/UL (ref 150–450)
POTASSIUM SERPL-SCNC: 4.6 MMOL/L (ref 3.5–5.2)
PROT SERPL-MCNC: 7.1 G/DL (ref 6–8.5)
RBC # BLD AUTO: 5.05 X10E6/UL (ref 3.77–5.28)
SODIUM SERPL-SCNC: 138 MMOL/L (ref 134–144)
TRIGL SERPL-MCNC: 201 MG/DL (ref 0–149)
TSH SERPL DL<=0.005 MIU/L-ACNC: 3.38 UIU/ML (ref 0.45–4.5)
VIT B12 SERPL-MCNC: 521 PG/ML (ref 232–1245)
VLDLC SERPL CALC-MCNC: 39 MG/DL (ref 5–40)
WBC # BLD AUTO: 8.8 X10E3/UL (ref 3.4–10.8)

## 2025-03-11 ENCOUNTER — OFFICE VISIT (OUTPATIENT)
Dept: FAMILY MEDICINE CLINIC | Facility: CLINIC | Age: 60
End: 2025-03-11
Payer: COMMERCIAL

## 2025-03-11 ENCOUNTER — TELEPHONE (OUTPATIENT)
Dept: FAMILY MEDICINE CLINIC | Facility: CLINIC | Age: 60
End: 2025-03-11

## 2025-03-11 VITALS
WEIGHT: 149.5 LBS | OXYGEN SATURATION: 97 % | DIASTOLIC BLOOD PRESSURE: 84 MMHG | SYSTOLIC BLOOD PRESSURE: 130 MMHG | HEIGHT: 61 IN | BODY MASS INDEX: 28.22 KG/M2 | HEART RATE: 76 BPM

## 2025-03-11 DIAGNOSIS — E66.3 OVERWEIGHT (BMI 25.0-29.9): ICD-10-CM

## 2025-03-11 DIAGNOSIS — R82.90 NONSPECIFIC FINDING ON EXAMINATION OF URINE: ICD-10-CM

## 2025-03-11 DIAGNOSIS — R10.30 LOWER ABDOMINAL PAIN: ICD-10-CM

## 2025-03-11 DIAGNOSIS — R71.8 ELEVATED HEMATOCRIT: ICD-10-CM

## 2025-03-11 DIAGNOSIS — M54.42 ACUTE LEFT-SIDED LOW BACK PAIN WITH LEFT-SIDED SCIATICA: Primary | ICD-10-CM

## 2025-03-11 DIAGNOSIS — Z87.19 HISTORY OF DIVERTICULITIS: ICD-10-CM

## 2025-03-11 DIAGNOSIS — E83.41 HIGH MAGNESIUM LEVELS: ICD-10-CM

## 2025-03-11 DIAGNOSIS — Z79.899 ENCOUNTER FOR LONG-TERM (CURRENT) USE OF MEDICATIONS: ICD-10-CM

## 2025-03-11 DIAGNOSIS — E78.2 MIXED HYPERLIPIDEMIA: ICD-10-CM

## 2025-03-11 DIAGNOSIS — Z12.31 ENCOUNTER FOR SCREENING MAMMOGRAM FOR MALIGNANT NEOPLASM OF BREAST: Primary | ICD-10-CM

## 2025-03-11 LAB
B-HCG UR QL: NEGATIVE
BILIRUB BLD-MCNC: NEGATIVE MG/DL
CLARITY, POC: CLEAR
COLOR UR: YELLOW
EXPIRATION DATE: ABNORMAL
EXPIRATION DATE: NORMAL
GLUCOSE UR STRIP-MCNC: NEGATIVE MG/DL
INTERNAL NEGATIVE CONTROL: NORMAL
INTERNAL POSITIVE CONTROL: NORMAL
KETONES UR QL: NEGATIVE
LEUKOCYTE EST, POC: NEGATIVE
Lab: ABNORMAL
Lab: NORMAL
NITRITE UR-MCNC: NEGATIVE MG/ML
PH UR: 7 [PH] (ref 5–8)
PROT UR STRIP-MCNC: NEGATIVE MG/DL
RBC # UR STRIP: ABNORMAL /UL
SP GR UR: 1.02 (ref 1–1.03)
UROBILINOGEN UR QL: NORMAL

## 2025-03-11 NOTE — PROGRESS NOTES
Chief Complaint  Back Pain    Subjective          Betty Ann Hume presents to [facility]  Back Pain  Associated symptoms include abdominal pain. Pertinent negatives include no dysuria, fever, numbness or weakness.     History of Present Illness  The patient is a 60-year-old female who presents for evaluation of diverticulitis, low back pain, and diabetes.    She experienced a flare-up of her diverticulitis a couple of weeks ago, which she managed at home through dietary modifications and increased fluid intake. Despite the severity of the episode, she was able to avoid hospitalization. She reports persistent symptoms, including bloating and pain after eating, and irregular bowel movements but states greatly improved. She is concerned about the need for antibiotic treatment. She has not observed any blood or black stool. She is up to date on her colonoscopies, which are performed every 3 years due to her diverticulitis. Polyps are typically removed during these procedures.    Last week, she experienced an exacerbation of her back pain, which she attributes to her known diagnosis of degenerative disc disease. She attempted to schedule an appointment but was unable to secure one within the same week. She has been performing stretching exercises learned from physical therapy, which have been beneficial. However, she also experienced a flare-up of sciatica, characterized by pain radiating down her shin bone, which disrupted her sleep. This pain has since improved. Her employer's HR department has suggested that she apply for FMLA due to the physical demands of her job, which requires her to be on her feet for 10 hours a day. She has requested time off until Friday to allow for additional rest. She continues to experience a pinching sensation in her back and believes the inflammation persists. She has been using diclofenac, prescribed by her rheumatologist, sparingly due to its tendency to  "cause bloating. She took it over the weekend to alleviate her pain and found it effective in reducing inflammation. She has also been using a heating pad, which has provided relief. She does not require a refill of her diclofenac prescription at this time. States this is improving and just requesting [facility] paperwork.     She was previously prescribed Rybelsus, but her insurance refused coverage, so she has not been taking it. She experienced constipation and nausea when her Wegovy dosage was increased, which was concerning given her history of diverticulitis. She was hoping to try Rybelsus to see if she had better luck with that for weight loss. States no personal or family history of thyroid cancer.  States no risk retransfer pregnancy.    SOCIAL HISTORY  She works at [employer] in [place].    MEDICATIONS  Current: Diclofenac  Discontinued: Daisy Che    Patient Care Team:  Usama Zaraet APRN as PCP - General (Nurse Practitioner)  Joseph Arthur MD as Consulting Physician (Gastroenterology)  Bryn Haile MD as Consulting Physician (Cardiology)     Objective   Vital Signs:   /84   Pulse 76   Ht 154.9 cm (61\")   Wt 67.8 kg (149 lb 8 oz)   SpO2 97%   BMI 28.25 kg/m²     Body mass index is 28.25 kg/m².    Review of Systems   Constitutional:  Negative for chills, fatigue and fever.   HENT:  Negative for congestion.    Eyes:  Negative for visual disturbance.   Respiratory:  Negative for cough, shortness of breath and wheezing.    Cardiovascular: Negative.    Gastrointestinal:  Positive for abdominal pain. Negative for constipation, diarrhea, nausea and vomiting.   Genitourinary:  Negative for decreased urine volume, dysuria, frequency, hematuria and urgency.   Musculoskeletal:  Positive for back pain.   Skin:  Negative for rash.   Neurological:  Negative for weakness, numbness and headache.       Past History:  Medical History: has a past medical history of Abnormality of right " breast on screening mammogram (11/12/2019), Anemia, ASCUS with positive high risk HPV cervical (07/29/2019), Cervical dysplasia, Diverticulitis, Headache, HPV (human papilloma virus) infection, Hypercholesterolemia, Hypertension, IBS (irritable bowel syndrome), and Screening for breast cancer (05/12/2022).   Surgical History: has a past surgical history that includes Knee surgery (Right, 2008); Tubal ligation (2003); Breast Augmentation (2004); Knee arthroscopy (Right, 05/20/2019); Augmentation mammaplasty; Total knee arthroplasty (Right, 05/2020); and Knee surgery (Left).   Family History: family history includes Asthma in her mother; Colon cancer in her maternal grandmother; Diabetes in her father and mother; Heart disease in her father, mother, and sister; Hodgkin's lymphoma in her sister; Hypertension in her father, mother, and sister; Lupus in her sister; Osteoporosis in her mother and sister; Skin cancer in her brother; Stroke in her mother; Thyroid disease in her father, mother, and sister.   Social History: reports that she has been smoking cigarettes. She has a 12.5 pack-year smoking history. She has never used smokeless tobacco. She reports current alcohol use. She reports that she does not use drugs.    PHQ-2 Depression Screening  Little interest or pleasure in doing things? Not at all   Feeling down, depressed, or hopeless? Not at all   PHQ-2 Total Score 0       PHQ-9 Depression Screening  Little interest or pleasure in doing things? Not at all   Feeling down, depressed, or hopeless? Not at all   PHQ-2 Total Score 0   Trouble falling or staying asleep, or sleeping too much?     Feeling tired or having little energy?     Poor appetite or overeating?     Feeling bad about yourself - or that you are a failure or have let yourself or your family down?     Trouble concentrating on things, such as reading the newspaper or watching television?     Moving or speaking so slowly that other people could have  noticed? Or the opposite - being so fidgety or restless that you have been moving around a lot more than usual?     Thoughts that you would be better off dead, or of hurting yourself in some way?     PHQ-9 Total Score     If you checked off any problems, how difficult have these problems made it for you to do your work, take care of things at home, or get along with other people? Not difficult at all        PHQ-9 Total Score:        Patient screened positive for depression based on a PHQ-9 score of  on . Follow-up recommendations include:          Current Outpatient Medications:     aspirin (aspirin) 81 MG EC tablet, Take 1 tablet by mouth Daily., Disp: 30 tablet, Rfl: 0    DULoxetine (CYMBALTA) 20 MG capsule, Take 1 capsule by mouth 2 (Two) Times a Day., Disp: 180 capsule, Rfl: 1    omeprazole (priLOSEC) 40 MG capsule, Take 1 capsule by mouth Daily., Disp: 90 capsule, Rfl: 1    ramipril (Altace) 2.5 MG capsule, Take 1 capsule by mouth Daily., Disp: 90 capsule, Rfl: 3    amoxicillin-clavulanate (AUGMENTIN) 875-125 MG per tablet, Take 1 tablet by mouth 2 (Two) Times a Day., Disp: 20 tablet, Rfl: 0   (Not in a hospital admission)     Allergies: Erythromycin, Shellfish-derived products, Statins, and Latex    Physical Exam  Constitutional:       Appearance: Normal appearance.   Eyes:      Conjunctiva/sclera: Conjunctivae normal.      Pupils: Pupils are equal, round, and reactive to light.   Cardiovascular:      Rate and Rhythm: Normal rate and regular rhythm.      Heart sounds: Normal heart sounds.   Pulmonary:      Effort: Pulmonary effort is normal.      Breath sounds: Normal breath sounds.   Abdominal:      General: Abdomen is flat. Bowel sounds are normal. There is no distension.      Palpations: Abdomen is soft.      Tenderness: There is no abdominal tenderness. There is no guarding or rebound.   Musculoskeletal:      Lumbar back: Normal.   Neurological:      General: No focal deficit present.      Mental Status:  She is alert and oriented to person, place, and time. Mental status is at baseline.   Psychiatric:         Mood and Affect: Mood normal.         Behavior: Behavior normal.         Thought Content: Thought content normal.         Judgment: Judgment normal.        Physical Exam  Lungs were auscultated.  Heart was examined.  Abdominal exam was performed.    Result Review :          Results  Laboratory Studies  Hematocrit was slightly elevated. Magnesium was high. LDL cholesterol was increasing. Blood sugar was normal in February 2025.             Assessment and Plan    Diagnoses and all orders for this visit:    1. Acute left-sided low back pain with left-sided sciatica (Primary)    2. Lower abdominal pain  -     POCT urinalysis dipstick, automated  -     CBC & Differential  -     POC Urinalysis Dipstick, Automated; Future  -     POC Pregnancy, Urine; Future  -     amoxicillin-clavulanate (AUGMENTIN) 875-125 MG per tablet; Take 1 tablet by mouth 2 (Two) Times a Day.  Dispense: 20 tablet; Refill: 0    3. History of diverticulitis  -     CBC & Differential  -     amoxicillin-clavulanate (AUGMENTIN) 875-125 MG per tablet; Take 1 tablet by mouth 2 (Two) Times a Day.  Dispense: 20 tablet; Refill: 0    4. High magnesium levels  -     Magnesium    5. Elevated hematocrit  -     CBC & Differential    6. Mixed hyperlipidemia  -     Lipid Panel    7. Encounter for long-term (current) use of medications  -     Comprehensive Metabolic Panel    8. Overweight (BMI 25.0-29.9)  -     Ambulatory Referral to Weight Management Program    9. Nonspecific finding on examination of urine  -     Urine Culture - Urine, Urine, Clean Catch      Assessment & Plan  1. Diverticulitis.  She reports a recent flare-up of diverticulitis, which she managed at home with dietary modifications and hydration. She continues to experience bloating, pain, and irregular bowel movements. A course of antibiotics will be initiated to address the lingering symptoms.  A complete blood count (CBC) will be ordered to monitor her white blood cell count.  UA completed.  Culture sent.  Call in 2 days for results.  Return in 1 to 2 weeks for repeat UA to make sure clears.  Pregnancy test negative today in clinic.  If symptoms persist or worsen despite antibiotic treatment, she is advised to contact the office immediately, at which point a CT scan may be considered.  Denies CT scan for the time being and states she will do if does not improve.  Proper diet and exercise plan discussed and encouraged.  Risk of meds discussed and understood.  Return in 2 days if no improvement, sooner if worsens.  Return to clinic or ED with any issues or concerns.    2. Low back pain.  She experienced a episode of back pain last week, which was managed with stretches learned from physical therapy, diclofenac, and the use of a heating pad. She reports improvement but still feels some discomfort. [place] paperwork will be completed to accommodate her need for occasional days off due to flare-ups.  Denies further workup at this time.  If continues to become an issue and seeks further evaluation she will return to clinic.  States she has enough diclofenac to use at home as needed.    3. Overweight.  Her blood glucose levels were within the normal range during the last check in February 2025.  Insurance will likely not cover the Rybelsus as she is not a diabetic.  I will go ahead and place referral to weight management for further evaluation.    4. Elevated Hematocrit.  Her previous hematocrit levels were slightly elevated. A repeat CBC will be ordered to monitor her hematocrit levels.    5. Elevated LDL Cholesterol.  Her previous LDL cholesterol levels were elevated. A lipid panel will be ordered to reassess her cholesterol levels.  She states she will return tomorrow morning for.                    Follow Up   Return if symptoms worsen or fail to improve.  Patient was given instructions and counseling regarding  her condition or for health maintenance advice. Please see specific information pulled into the AVS if appropriate.     Patient or patient representative verbalized consent for the use of Ambient Listening during the visit with  LARY Villatoro for chart documentation. 3/11/2025  15:52 EDT    LARY Villatoro

## 2025-03-11 NOTE — TELEPHONE ENCOUNTER
Please find out when patient is due a repeat colonoscopy and let me and patient know.    Please also let patient know that it looks like she is past due a mammogram.  Would she like to have one ordered?  Let me know.    Also it looks like her insurance is not going to cover the Rybelsus as she is not a diabetic so there is nothing we can do to get around that.  I have went ahead and placed the referral to the weight management team in [place] to discuss further options.  If she does not want that referral let me know and we can cancel it out but they are helpful.  Thanks

## 2025-03-12 ENCOUNTER — LAB (OUTPATIENT)
Dept: FAMILY MEDICINE CLINIC | Facility: CLINIC | Age: 60
End: 2025-03-12
Payer: COMMERCIAL

## 2025-03-13 LAB
ALBUMIN SERPL-MCNC: 4.3 G/DL (ref 3.8–4.9)
ALP SERPL-CCNC: 91 IU/L (ref 44–121)
ALT SERPL-CCNC: 12 IU/L (ref 0–32)
AST SERPL-CCNC: 14 IU/L (ref 0–40)
BACTERIA UR CULT: NORMAL
BACTERIA UR CULT: NORMAL
BASOPHILS # BLD AUTO: 0.1 X10E3/UL (ref 0–0.2)
BASOPHILS NFR BLD AUTO: 1 %
BILIRUB SERPL-MCNC: 0.3 MG/DL (ref 0–1.2)
BUN SERPL-MCNC: 16 MG/DL (ref 6–24)
BUN/CREAT SERPL: 28 (ref 9–23)
CALCIUM SERPL-MCNC: 9.3 MG/DL (ref 8.7–10.2)
CHLORIDE SERPL-SCNC: 103 MMOL/L (ref 96–106)
CHOLEST SERPL-MCNC: 251 MG/DL (ref 100–199)
CO2 SERPL-SCNC: 25 MMOL/L (ref 20–29)
CREAT SERPL-MCNC: 0.58 MG/DL (ref 0.57–1)
EGFRCR SERPLBLD CKD-EPI 2021: 104 ML/MIN/1.73
EOSINOPHIL # BLD AUTO: 0.3 X10E3/UL (ref 0–0.4)
EOSINOPHIL NFR BLD AUTO: 3 %
ERYTHROCYTE [DISTWIDTH] IN BLOOD BY AUTOMATED COUNT: 14.1 % (ref 11.7–15.4)
GLOBULIN SER CALC-MCNC: 2.2 G/DL (ref 1.5–4.5)
GLUCOSE SERPL-MCNC: 86 MG/DL (ref 70–99)
HCT VFR BLD AUTO: 45.6 % (ref 34–46.6)
HDLC SERPL-MCNC: 32 MG/DL
HGB BLD-MCNC: 14.8 G/DL (ref 11.1–15.9)
IMM GRANULOCYTES # BLD AUTO: 0 X10E3/UL (ref 0–0.1)
IMM GRANULOCYTES NFR BLD AUTO: 0 %
LDLC SERPL CALC-MCNC: 180 MG/DL (ref 0–99)
LYMPHOCYTES # BLD AUTO: 2.4 X10E3/UL (ref 0.7–3.1)
LYMPHOCYTES NFR BLD AUTO: 27 %
MAGNESIUM SERPL-MCNC: 2.1 MG/DL (ref 1.6–2.3)
MCH RBC QN AUTO: 30.1 PG (ref 26.6–33)
MCHC RBC AUTO-ENTMCNC: 32.5 G/DL (ref 31.5–35.7)
MCV RBC AUTO: 93 FL (ref 79–97)
MONOCYTES # BLD AUTO: 0.9 X10E3/UL (ref 0.1–0.9)
MONOCYTES NFR BLD AUTO: 10 %
NEUTROPHILS # BLD AUTO: 5.4 X10E3/UL (ref 1.4–7)
NEUTROPHILS NFR BLD AUTO: 59 %
PLATELET # BLD AUTO: 325 X10E3/UL (ref 150–450)
POTASSIUM SERPL-SCNC: 4.1 MMOL/L (ref 3.5–5.2)
PROT SERPL-MCNC: 6.5 G/DL (ref 6–8.5)
RBC # BLD AUTO: 4.92 X10E6/UL (ref 3.77–5.28)
SODIUM SERPL-SCNC: 140 MMOL/L (ref 134–144)
TRIGL SERPL-MCNC: 205 MG/DL (ref 0–149)
VLDLC SERPL CALC-MCNC: 39 MG/DL (ref 5–40)
WBC # BLD AUTO: 9.1 X10E3/UL (ref 3.4–10.8)

## 2025-03-13 NOTE — TELEPHONE ENCOUNTER
Patient informed and voiced understanding.      She wants us to schedule her mammo and cancel the referral to weight management

## 2025-03-14 NOTE — TELEPHONE ENCOUNTER
Mammogram order placed.  Can someone please figure out how to cancel out the weight management referral or ask the referral team to cancel it.  Thanks

## 2025-03-19 ENCOUNTER — OFFICE VISIT (OUTPATIENT)
Dept: CARDIOLOGY | Facility: CLINIC | Age: 60
End: 2025-03-19
Payer: COMMERCIAL

## 2025-03-19 VITALS
HEIGHT: 61 IN | BODY MASS INDEX: 27.75 KG/M2 | SYSTOLIC BLOOD PRESSURE: 124 MMHG | WEIGHT: 147 LBS | TEMPERATURE: 97 F | HEART RATE: 82 BPM | DIASTOLIC BLOOD PRESSURE: 74 MMHG | RESPIRATION RATE: 12 BRPM | OXYGEN SATURATION: 97 %

## 2025-03-19 DIAGNOSIS — E78.01 FAMILIAL HYPERCHOLESTEROLEMIA: ICD-10-CM

## 2025-03-19 DIAGNOSIS — I10 BENIGN ESSENTIAL HTN: Primary | ICD-10-CM

## 2025-03-19 DIAGNOSIS — I73.9 PVD (PERIPHERAL VASCULAR DISEASE) WITH CLAUDICATION: ICD-10-CM

## 2025-03-19 DIAGNOSIS — Z72.0 TOBACCO USE: ICD-10-CM

## 2025-03-19 PROBLEM — E78.2 MIXED HYPERLIPIDEMIA: Status: RESOLVED | Noted: 2025-03-11 | Resolved: 2025-03-19

## 2025-03-19 PROCEDURE — 99214 OFFICE O/P EST MOD 30 MIN: CPT | Performed by: INTERNAL MEDICINE

## 2025-03-19 PROCEDURE — 93000 ELECTROCARDIOGRAM COMPLETE: CPT | Performed by: INTERNAL MEDICINE

## 2025-03-19 RX ORDER — RAMIPRIL 2.5 MG/1
2.5 CAPSULE ORAL DAILY
Qty: 90 CAPSULE | Refills: 3 | Status: SHIPPED | OUTPATIENT
Start: 2025-03-19

## 2025-03-19 NOTE — PROGRESS NOTES
[facility] [place]  [facility]IOLOG[facility]  [address] [place] [address]  Dept: [phone]  Dept Fax: [phone]    Betty Ann Hume  1965    Follow Up Office Visit Note    History of Present Illness:  Betty Ann Hume is a 60 y.o. female who presents to the clinic for Follow-up.Hypertension- The BP is good 110.60 on Ramipril 2,5 mg still smoking and has not been taking the Repatha, she is intolerant to statins and her Lipids are very bad,, will restart Repatha, plus get Lpa     The following portions of the patient's history were reviewed and updated as appropriate: allergies, current medications, past family history, past medical history, past social history, past surgical history, and problem list.    Medications:  amoxicillin-clavulanate  aspirin  DULoxetine  Evolocumab solution auto-injector  omeprazole  ramipril    Subjective  Allergies   Allergen Reactions   • Erythromycin Anaphylaxis, Swelling and Unknown (See Comments)   • Shellfish-Derived Products Anaphylaxis   • Statins Other (See Comments)     Severe body aches    • Latex Rash        Past Medical History:   Diagnosis Date   • Abnormality of right breast on screening mammogram 11/12/2019   • Anemia    • ASCUS with positive high risk HPV cervical 07/29/2019   • Cervical dysplasia    • Diverticulitis    • Headache     SINUS HEADACHE / MIGRAINE   • HPV (human papilloma virus) infection    • Hypercholesterolemia    • Hypertension    • IBS (irritable bowel syndrome)    • Screening for breast cancer 05/12/2022       Past Surgical History:   Procedure Laterality Date   • AUGMENTATION MAMMAPLASTY     • BREAST AUGMENTATION  2004    DOMINIQUE LATERAL   • KNEE ARTHROSCOPY Right 05/20/2019    turned into much more complicated   • KNEE SURGERY Right 2008   • KNEE SURGERY Left    • TOTAL KNEE ARTHROPLASTY Right 05/2020   • TUBAL ABDOMINAL LIGATION  2003       Family History   Problem Relation Age of Onset   • Osteoporosis Mother    •  "Thyroid disease Mother    • Diabetes Mother    • Heart disease Mother    • Hypertension Mother    • Stroke Mother    • Asthma Mother    • Thyroid disease Father    • Diabetes Father    • Heart disease Father    • Hypertension Father    • Osteoporosis Sister    • Lupus Sister    • Thyroid disease Sister    • Heart disease Sister    • Hypertension Sister    • Colon cancer Maternal Grandmother         late 60's   • Hodgkin's lymphoma Sister         NON   • Skin cancer Brother    • Breast cancer Neg Hx    • Ovarian cancer Neg Hx    • Uterine cancer Neg Hx         Social History     Socioeconomic History   • Marital status:    • Number of children: 2   Tobacco Use   • Smoking status: Every Day     Current packs/day: 0.50     Average packs/day: 0.5 packs/day for 25.0 years (12.5 ttl pk-yrs)     Types: Cigarettes   • Smokeless tobacco: Never   Vaping Use   • Vaping status: Never Used   Substance and Sexual Activity   • Alcohol use: Yes     Comment: OCC   • Drug use: No   • Sexual activity: Yes     Partners: Male     Birth control/protection: Post-menopausal     Comment: 1x per month       Review of Systems   Constitutional: Negative.    HENT: Negative.     Respiratory: Negative.     Cardiovascular: Negative.    Endocrine: Negative.    Genitourinary: Negative.    Musculoskeletal: Negative.    Skin: Negative.    Allergic/Immunologic: Negative.    Neurological: Negative.    Hematological: Negative.    Psychiatric/Behavioral: Negative.       Cardiovascular Procedures    ECHO/MUGA:  STRESS TESTS:   CARDIAC CATH:   DEVICES:   HOLTER:   CT/MRI:   VASCULAR:   CARDIOTHORACIC:     Objective  Vitals:    03/19/25 1510   BP: 124/74   BP Location: Right arm   Patient Position: Lying   Cuff Size: Adult   Pulse: 82   Resp: 12   Temp: 97 °F (36.1 °C)   TempSrc: Infrared   SpO2: 97%   Weight: 66.7 kg (147 lb)   Height: 154.9 cm (61\")   PainSc: 0-No pain     Body mass index is 27.78 kg/m².     Physical Exam  Vitals reviewed. "   Constitutional:       Appearance: Healthy appearance. Not in distress.   Neck:      Vascular: No JVR. JVD normal.   Pulmonary:      Effort: Pulmonary effort is normal.      Breath sounds: Normal breath sounds. No wheezing. No rhonchi. No rales.   Chest:      Chest wall: Not tender to palpatation.   Cardiovascular:      PMI at left midclavicular line. Normal rate. Regular rhythm. Normal S1. Normal S2.       Murmurs: There is no murmur.      No gallop.  No click. No rub.   Pulses:     Intact distal pulses.   Edema:     Peripheral edema absent.   Abdominal:      General: Bowel sounds are normal.      Palpations: Abdomen is soft.      Tenderness: There is no abdominal tenderness.   Musculoskeletal: Normal range of motion.         General: No tenderness. Skin:     General: Skin is warm and dry.   Neurological:      General: No focal deficit present.      Mental Status: Alert and oriented to person, place and time.        Diagnostic Data    ECG 12 Lead    Date/Time: 3/19/2025 3:32 PM  Performed by: Bryn Haile MD    Authorized by: Bryn Haile MD  Comparison: compared with previous ECG from 3/19/2024  Similar to previous ECG  Rhythm: sinus rhythm  Rate: normal  BPM: 68  QRS axis: normal    Clinical impression: normal ECG      Assessment and Plan  Diagnoses and all orders for this visit:    Benign essential HTN- BP is good., 110.70 on Ramipril 2,5 mg daily    Familial hypercholesterolemia- will restart Repatha, her lipids are bad, intolerant to statin  -     Lipoprotein A (LPA)    PVD (peripheral vascular disease) with claudication- no major claudication on ASA.  -     Lipoprotein A (LPA)    Tobacco use- advised to quit    Other orders  -     Evolocumab (REPATHA) solution auto-injector SureClick injection; Inject 1 mL under the skin into the appropriate area as directed Every 14 (Fourteen) Days.  -     ramipril (Altace) 2.5 MG capsule; Take 1 capsule by mouth Daily.         Return in about 1 year  (around 3/19/2026) for Recheck with Dr. Haile.    Bryn Haile MD  03/19/2025

## 2025-03-20 LAB — LPA SERPL-SCNC: 47.4 NMOL/L

## 2025-04-03 ENCOUNTER — TELEPHONE (OUTPATIENT)
Dept: FAMILY MEDICINE CLINIC | Facility: CLINIC | Age: 60
End: 2025-04-03

## 2025-04-03 NOTE — TELEPHONE ENCOUNTER
M    Hub to Relay    Please let patient know that they have been trying to contact her in regards to having a mammogram scheduled. They have not been able to get a hold of her. They are canceling out the order so I recommend she call up to the office and let us know if she wants to have this mammogram reordered. She is due a mammogram so I recommend. Thanks

## 2025-04-03 NOTE — TELEPHONE ENCOUNTER
Please let patient know that they have been trying to contact her in regards to having a mammogram scheduled.  They have not been able to get a hold of her.  They are canceling out the order so I recommend she call up to the office and let us know if she wants to have this mammogram reordered.  She is due a mammogram so I recommend.  Thanks

## 2025-04-04 ENCOUNTER — TELEPHONE (OUTPATIENT)
Dept: CARDIOLOGY | Facility: CLINIC | Age: 60
End: 2025-04-04
Payer: COMMERCIAL

## 2025-04-04 NOTE — TELEPHONE ENCOUNTER
Name: Hume, Betty Ann    Relationship: Self    Best Callback Number: [phone]    HUB PROVIDED THE RELAY MESSAGE FROM THE OFFICE   PATIENT VOICED UNDERSTANDING AND HAS NO FURTHER QUESTIONS AT THIS TIME    ADDITIONAL INFORMATION: PT STATED THAT SHE IS GOING TO HOLD OFF ON MAMMOGRAM FOR ANOTHER YEAR

## 2025-04-04 NOTE — TELEPHONE ENCOUNTER
Left a message for Ms Hume that the repatha was approved on 4/1/25.  To see if she can get it cheaper since she has commercial insurance go to [employer]/copay and sign up for the copay card.  Take a picture of it and take to pharmacist.

## 2025-04-04 NOTE — TELEPHONE ENCOUNTER
PT WENT TO GET HER REPATHA FROM PHARMACY,MENTIONED NEEDING A PA AND COST (75 A MONTH)    PT LOOKING FOR ALTERNATIVES OR WAYS TO LOWER COST    PLEASE ADVISE PT

## 2025-04-04 NOTE — TELEPHONE ENCOUNTER
LM on  to call back.    HUB to relay message from [facility]:     Please let patient know that they have been trying to contact her in regards to having a mammogram scheduled. They have not been able to get a hold of her. They are canceling out the order so I recommend she call up to the office and let us know if she wants to have this mammogram reordered. She is due a mammogram so I recommend. Thanks

## 2025-04-04 NOTE — TELEPHONE ENCOUNTER
ADVISED PT ON GOING TO [place] AND PROVIDED THE INSTRUCTIONS    PT HAS NO FURTHER QUESTIONS AT THIS TIME

## 2025-07-22 ENCOUNTER — OFFICE VISIT (OUTPATIENT)
Dept: FAMILY MEDICINE CLINIC | Facility: CLINIC | Age: 60
End: 2025-07-22
Payer: COMMERCIAL

## 2025-07-22 VITALS
BODY MASS INDEX: 28.19 KG/M2 | TEMPERATURE: 98.2 F | OXYGEN SATURATION: 97 % | HEIGHT: 62 IN | SYSTOLIC BLOOD PRESSURE: 126 MMHG | WEIGHT: 153.2 LBS | DIASTOLIC BLOOD PRESSURE: 84 MMHG | HEART RATE: 72 BPM

## 2025-07-22 DIAGNOSIS — R30.0 DYSURIA: Primary | ICD-10-CM

## 2025-07-22 LAB
BILIRUB BLD-MCNC: NEGATIVE MG/DL
CLARITY, POC: CLEAR
COLOR UR: YELLOW
EXPIRATION DATE: ABNORMAL
GLUCOSE UR STRIP-MCNC: NEGATIVE MG/DL
KETONES UR QL: NEGATIVE
LEUKOCYTE EST, POC: NEGATIVE
Lab: ABNORMAL
NITRITE UR-MCNC: POSITIVE MG/ML
PH UR: 7 [PH] (ref 5–8)
PROT UR STRIP-MCNC: NEGATIVE MG/DL
RBC # UR STRIP: ABNORMAL /UL
SP GR UR: 1.01 (ref 1–1.03)
UROBILINOGEN UR QL: ABNORMAL

## 2025-07-22 PROCEDURE — 81003 URINALYSIS AUTO W/O SCOPE: CPT | Performed by: NURSE PRACTITIONER

## 2025-07-22 PROCEDURE — 99213 OFFICE O/P EST LOW 20 MIN: CPT | Performed by: NURSE PRACTITIONER

## 2025-07-22 RX ORDER — SULFAMETHOXAZOLE AND TRIMETHOPRIM 800; 160 MG/1; MG/1
1 TABLET ORAL 2 TIMES DAILY
Qty: 14 TABLET | Refills: 0 | Status: SHIPPED | OUTPATIENT
Start: 2025-07-22 | End: 2025-07-29

## 2025-07-22 NOTE — PROGRESS NOTES
Office Note     Name: Betty Ann Hume    : 1965     MRN: [id number]     Chief Complaint  Abdominal Pain (Pt complains of abdominal pain onset yesterday. States she is also having urinary sxs and pain into her rectum. ) and blisters on roof of mouth (Pt complains of blisters on the roof of her mouth. )    Subjective     History of Present Illness:  Betty Ann Hume is a 60 y.o. female who presents today for c/o lower abdominal pain - c/o pain vaginal area - a lot of pressure but urine stream is weak. States also believes is a bit constipated - last BM was this AM but very little. She sometimes will take a probiotic to help. Has used miralax but admits is not regular about taking it. She also admits to headache with blisters on the roof of her mouth that started last night.  She admits to eating citrus foods recently.      Review of Systems:   Review of Systems as per HPI    Family History:   Family History   Problem Relation Age of Onset    Osteoporosis Mother     Thyroid disease Mother     Diabetes Mother     Heart disease Mother     Hypertension Mother     Stroke Mother     Asthma Mother     Thyroid disease Father     Diabetes Father     Heart disease Father     Hypertension Father     Osteoporosis Sister     Lupus Sister     Thyroid disease Sister     Heart disease Sister     Hypertension Sister     Colon cancer Maternal Grandmother         late 60's    Hodgkin's lymphoma Sister         NON    Skin cancer Brother     Breast cancer Neg Hx     Ovarian cancer Neg Hx     Uterine cancer Neg Hx        Social History:   Social History     Socioeconomic History    Marital status:     Number of children: 2   Tobacco Use    Smoking status: Every Day     Current packs/day: 0.50     Average packs/day: 0.5 packs/day for 25.0 years (12.5 ttl pk-yrs)     Types: Cigarettes    Smokeless tobacco: Never   Vaping Use    Vaping status: Never Used   Substance and Sexual Activity    Alcohol use: Yes     Comment: OCC     Drug use: No    Sexual activity: Yes     Partners: Male     Birth control/protection: Post-menopausal     Comment: 1x per month       Past Medical History:   Past Medical History:   Diagnosis Date    Abnormality of right breast on screening mammogram 11/12/2019    Anemia     ASCUS with positive high risk HPV cervical 07/29/2019    Cervical dysplasia     Diverticulitis     Headache     SINUS HEADACHE / MIGRAINE    HPV (human papilloma virus) infection     Hypercholesterolemia     Hypertension     IBS (irritable bowel syndrome)     Screening for breast cancer 05/12/2022       Past Surgical History:   Past Surgical History:   Procedure Laterality Date    AUGMENTATION MAMMAPLASTY      BREAST AUGMENTATION  2004    DOMINIQUE LATERAL    KNEE ARTHROSCOPY Right 05/20/2019    turned into much more complicated    KNEE SURGERY Right 2008    KNEE SURGERY Left     TOTAL KNEE ARTHROPLASTY Right 05/2020    TUBAL ABDOMINAL LIGATION  2003       Immunizations:   Immunization History   Administered Date(s) Administered    COVID-19 (MODERNA) 1st,2nd,3rd Dose Monovalent 10/11/2021, 01/16/2022    Tdap 03/26/2024        Medications:     Current Outpatient Medications:     aspirin (aspirin) 81 MG EC tablet, Take 1 tablet by mouth Daily., Disp: 30 tablet, Rfl: 0    DULoxetine (CYMBALTA) 20 MG capsule, Take 1 capsule by mouth 2 (Two) Times a Day., Disp: 180 capsule, Rfl: 1    Evolocumab (REPATHA) solution auto-injector SureClick injection, Inject 1 mL under the skin into the appropriate area as directed Every 14 (Fourteen) Days., Disp: 6 mL, Rfl: 3    omeprazole (priLOSEC) 40 MG capsule, Take 1 capsule by mouth Daily., Disp: 90 capsule, Rfl: 1    ramipril (Altace) 2.5 MG capsule, Take 1 capsule by mouth Daily., Disp: 90 capsule, Rfl: 3    sulfamethoxazole-trimethoprim (Bactrim DS) 800-160 MG per tablet, Take 1 tablet by mouth 2 (Two) Times a Day for 7 days., Disp: 14 tablet, Rfl: 0    Allergies:   Allergies   Allergen Reactions    Erythromycin  "Anaphylaxis, Swelling and Unknown (See Comments)    Shellfish-Derived Products Anaphylaxis    Statins Other (See Comments)     Severe body aches     Latex Rash       Objective     Vital Signs  /84   Pulse 72   Temp 98.2 °F (36.8 °C) (Oral)   Ht 157.5 cm (62\")   Wt 69.5 kg (153 lb 3.2 oz)   SpO2 97%   BMI 28.02 kg/m²   Estimated body mass index is 28.02 kg/m² as calculated from the following:    Height as of this encounter: 157.5 cm (62\").    Weight as of this encounter: 69.5 kg (153 lb 3.2 oz).            Physical Exam  Vitals and nursing note reviewed.   Constitutional:       General: She is not in acute distress.     Appearance: Normal appearance. She is not ill-appearing.   HENT:      Head: Normocephalic and atraumatic.      Right Ear: Ear canal normal.      Left Ear: Ear canal normal.      Ears:      Comments: Is bilaterally dull but otherwise no erythema or bulging     Nose: Nose normal.      Mouth/Throat:      Mouth: Mucous membranes are moist.      Dentition: No dental caries.      Tongue: No lesions.      Pharynx: No pharyngeal swelling, oropharyngeal exudate, uvula swelling or postnasal drip.      Tonsils: No tonsillar exudate.        Comments: Few small erythematous papules to roof of mouth but otherwise mouth is normal  Eyes:      Pupils: Pupils are equal, round, and reactive to light.   Cardiovascular:      Rate and Rhythm: Normal rate and regular rhythm.   Pulmonary:      Effort: Pulmonary effort is normal.      Breath sounds: Normal breath sounds.   Abdominal:      General: Abdomen is flat. Bowel sounds are normal. There is no distension.      Palpations: Abdomen is soft. There is no mass.      Tenderness: There is abdominal tenderness (suprapubic). There is no right CVA tenderness, left CVA tenderness, guarding or rebound.      Hernia: No hernia is present.   Skin:     General: Skin is warm and dry.   Neurological:      Mental Status: She is alert and oriented to person, place, and time. "   Psychiatric:         Mood and Affect: Mood normal.         Behavior: Behavior normal.          Procedures    Assessment and Plan     1. Dysuria    - POC Urinalysis Dipstick, Automated  - Urine Culture - Urine, Urine, Clean Catch  - sulfamethoxazole-trimethoprim (Bactrim DS) 800-160 MG per tablet; Take 1 tablet by mouth 2 (Two) Times a Day for 7 days.  Dispense: 14 tablet; Refill: 0     POCT urinalysis positive for UTI (nitrites and blood).  Reviewed use of Bactrim including side effects.  Advised patient to greatly increase fluid intake.  Recommended regular use of MiraLAX for constipation.  Encouraged patient to return to clinic if symptoms worsen or do not improve with medication use.  We will contact with culture results upon receipt.  Advised that mouth symptoms should improve with time.  Patient stated understanding and agreed with plan of care.    Follow Up  Return if symptoms worsen or fail to improve.    [facility]  [address] [place] [id number] [phone]

## 2025-07-24 ENCOUNTER — TELEPHONE (OUTPATIENT)
Dept: CARDIOLOGY | Facility: CLINIC | Age: 60
End: 2025-07-24

## 2025-07-24 LAB
BACTERIA UR CULT: NO GROWTH
BACTERIA UR CULT: NORMAL

## 2025-08-19 ENCOUNTER — OFFICE VISIT (OUTPATIENT)
Dept: FAMILY MEDICINE CLINIC | Facility: CLINIC | Age: 60
End: 2025-08-19
Payer: COMMERCIAL

## 2025-08-19 VITALS
OXYGEN SATURATION: 98 % | BODY MASS INDEX: 28.24 KG/M2 | SYSTOLIC BLOOD PRESSURE: 122 MMHG | DIASTOLIC BLOOD PRESSURE: 80 MMHG | HEART RATE: 75 BPM | HEIGHT: 62 IN | WEIGHT: 153.44 LBS

## 2025-08-19 DIAGNOSIS — K59.00 CONSTIPATION, UNSPECIFIED CONSTIPATION TYPE: Primary | ICD-10-CM

## 2025-08-19 DIAGNOSIS — Z12.4 SCREENING FOR CERVICAL CANCER: ICD-10-CM

## 2025-08-19 DIAGNOSIS — J30.2 SEASONAL ALLERGIC RHINITIS, UNSPECIFIED TRIGGER: ICD-10-CM

## 2025-08-19 DIAGNOSIS — E78.2 MIXED HYPERLIPIDEMIA: ICD-10-CM

## 2025-08-19 DIAGNOSIS — I10 BENIGN ESSENTIAL HTN: ICD-10-CM

## 2025-08-19 DIAGNOSIS — Z12.11 SCREENING FOR COLON CANCER: ICD-10-CM

## 2025-08-19 DIAGNOSIS — M25.531 RIGHT WRIST PAIN: ICD-10-CM

## 2025-08-19 DIAGNOSIS — Z12.31 ENCOUNTER FOR SCREENING MAMMOGRAM FOR MALIGNANT NEOPLASM OF BREAST: ICD-10-CM

## 2025-08-19 LAB
BILIRUB BLD-MCNC: NEGATIVE MG/DL
CLARITY, POC: CLEAR
COLOR UR: YELLOW
EXPIRATION DATE: NORMAL
GLUCOSE UR STRIP-MCNC: NEGATIVE MG/DL
KETONES UR QL: NEGATIVE
LEUKOCYTE EST, POC: NEGATIVE
Lab: NORMAL
NITRITE UR-MCNC: NEGATIVE MG/ML
PH UR: 5.5 [PH] (ref 5–8)
PROT UR STRIP-MCNC: NEGATIVE MG/DL
RBC # UR STRIP: NEGATIVE /UL
SP GR UR: 1.03 (ref 1–1.03)
UROBILINOGEN UR QL: NORMAL

## 2025-08-19 RX ORDER — FLUTICASONE PROPIONATE 50 MCG
2 SPRAY, SUSPENSION (ML) NASAL DAILY
Qty: 16 G | Refills: 2 | Status: SHIPPED | OUTPATIENT
Start: 2025-08-19

## 2025-08-19 RX ORDER — LORATADINE 10 MG/1
10 TABLET ORAL DAILY
Qty: 30 TABLET | Refills: 1 | Status: SHIPPED | OUTPATIENT
Start: 2025-08-19

## 2025-08-20 ENCOUNTER — RESULTS FOLLOW-UP (OUTPATIENT)
Dept: FAMILY MEDICINE CLINIC | Facility: CLINIC | Age: 60
End: 2025-08-20
Payer: COMMERCIAL

## 2025-08-20 DIAGNOSIS — M25.531 RIGHT WRIST PAIN: Primary | ICD-10-CM

## 2025-08-20 LAB
ALBUMIN SERPL-MCNC: 4.6 G/DL (ref 3.8–4.9)
ALP SERPL-CCNC: 95 IU/L (ref 44–121)
ALT SERPL-CCNC: 14 IU/L (ref 0–32)
AST SERPL-CCNC: 22 IU/L (ref 0–40)
BASOPHILS # BLD AUTO: 0.1 X10E3/UL (ref 0–0.2)
BASOPHILS NFR BLD AUTO: 1 %
BILIRUB SERPL-MCNC: 0.2 MG/DL (ref 0–1.2)
BUN SERPL-MCNC: 14 MG/DL (ref 6–24)
BUN/CREAT SERPL: 20 (ref 9–23)
CALCIUM SERPL-MCNC: 10 MG/DL (ref 8.7–10.2)
CHLORIDE SERPL-SCNC: 104 MMOL/L (ref 96–106)
CO2 SERPL-SCNC: 21 MMOL/L (ref 20–29)
CREAT SERPL-MCNC: 0.69 MG/DL (ref 0.57–1)
EGFRCR SERPLBLD CKD-EPI 2021: 100 ML/MIN/1.73
EOSINOPHIL # BLD AUTO: 0.3 X10E3/UL (ref 0–0.4)
EOSINOPHIL NFR BLD AUTO: 3 %
ERYTHROCYTE [DISTWIDTH] IN BLOOD BY AUTOMATED COUNT: 13.9 % (ref 11.7–15.4)
GLOBULIN SER CALC-MCNC: 2.5 G/DL (ref 1.5–4.5)
GLUCOSE SERPL-MCNC: 106 MG/DL (ref 70–99)
HCT VFR BLD AUTO: 44.2 % (ref 34–46.6)
HGB BLD-MCNC: 14.3 G/DL (ref 11.1–15.9)
IMM GRANULOCYTES # BLD AUTO: 0.1 X10E3/UL (ref 0–0.1)
IMM GRANULOCYTES NFR BLD AUTO: 1 %
LYMPHOCYTES # BLD AUTO: 2.5 X10E3/UL (ref 0.7–3.1)
LYMPHOCYTES NFR BLD AUTO: 21 %
MCH RBC QN AUTO: 31.5 PG (ref 26.6–33)
MCHC RBC AUTO-ENTMCNC: 32.4 G/DL (ref 31.5–35.7)
MCV RBC AUTO: 97 FL (ref 79–97)
MONOCYTES # BLD AUTO: 0.9 X10E3/UL (ref 0.1–0.9)
MONOCYTES NFR BLD AUTO: 7 %
NEUTROPHILS # BLD AUTO: 7.8 X10E3/UL (ref 1.4–7)
NEUTROPHILS NFR BLD AUTO: 67 %
PLATELET # BLD AUTO: 330 X10E3/UL (ref 150–450)
POTASSIUM SERPL-SCNC: 3.8 MMOL/L (ref 3.5–5.2)
PROT SERPL-MCNC: 7.1 G/DL (ref 6–8.5)
RBC # BLD AUTO: 4.54 X10E6/UL (ref 3.77–5.28)
SODIUM SERPL-SCNC: 142 MMOL/L (ref 134–144)
TSH SERPL DL<=0.005 MIU/L-ACNC: 1.42 UIU/ML (ref 0.45–4.5)
WBC # BLD AUTO: 11.7 X10E3/UL (ref 3.4–10.8)